# Patient Record
Sex: FEMALE | Race: WHITE | NOT HISPANIC OR LATINO | Employment: OTHER | URBAN - NONMETROPOLITAN AREA
[De-identification: names, ages, dates, MRNs, and addresses within clinical notes are randomized per-mention and may not be internally consistent; named-entity substitution may affect disease eponyms.]

---

## 2018-03-07 ENCOUNTER — HISTORICAL (OUTPATIENT)
Dept: ADMINISTRATIVE | Facility: HOSPITAL | Age: 78
End: 2018-03-07

## 2018-04-06 ENCOUNTER — HISTORICAL (OUTPATIENT)
Dept: ADMINISTRATIVE | Facility: HOSPITAL | Age: 78
End: 2018-04-06

## 2018-07-11 ENCOUNTER — HISTORICAL (OUTPATIENT)
Dept: ADMINISTRATIVE | Facility: HOSPITAL | Age: 78
End: 2018-07-11

## 2019-03-11 ENCOUNTER — HISTORICAL (OUTPATIENT)
Dept: ADMINISTRATIVE | Facility: HOSPITAL | Age: 79
End: 2019-03-11

## 2019-07-02 ENCOUNTER — HISTORICAL (OUTPATIENT)
Dept: ADMINISTRATIVE | Facility: HOSPITAL | Age: 79
End: 2019-07-02

## 2020-01-23 ENCOUNTER — HISTORICAL (OUTPATIENT)
Dept: ADMINISTRATIVE | Facility: HOSPITAL | Age: 80
End: 2020-01-23

## 2020-04-23 ENCOUNTER — HISTORICAL (OUTPATIENT)
Dept: ADMINISTRATIVE | Facility: HOSPITAL | Age: 80
End: 2020-04-23

## 2020-09-14 ENCOUNTER — HISTORICAL (OUTPATIENT)
Dept: ADMINISTRATIVE | Facility: HOSPITAL | Age: 80
End: 2020-09-14

## 2020-09-15 ENCOUNTER — HISTORICAL (OUTPATIENT)
Dept: ADMINISTRATIVE | Facility: HOSPITAL | Age: 80
End: 2020-09-15

## 2020-11-24 ENCOUNTER — HISTORICAL (OUTPATIENT)
Dept: ADMINISTRATIVE | Facility: HOSPITAL | Age: 80
End: 2020-11-24

## 2021-01-20 LAB
DEPRECATED CALCIDIOL+CALCIFEROL SERPL-MC: 40.2 NG/ML (ref 30–100)
EST. AVERAGE GLUCOSE BLD GHB EST-MCNC: 102 MG/DL (ref 70–115)
HBA1C MFR BLD: 5.4 % (ref 4–6)
VIT B12 SERPL-MCNC: >1000 PG/ML (ref 211–946)

## 2021-03-05 ENCOUNTER — HISTORICAL (OUTPATIENT)
Dept: ADMINISTRATIVE | Facility: HOSPITAL | Age: 81
End: 2021-03-05

## 2021-08-01 ENCOUNTER — HISTORICAL (OUTPATIENT)
Dept: ADMINISTRATIVE | Facility: HOSPITAL | Age: 81
End: 2021-08-01

## 2021-08-02 ENCOUNTER — HISTORICAL (OUTPATIENT)
Dept: ADMINISTRATIVE | Facility: HOSPITAL | Age: 81
End: 2021-08-02

## 2021-08-17 ENCOUNTER — HISTORICAL (OUTPATIENT)
Dept: ADMINISTRATIVE | Facility: HOSPITAL | Age: 81
End: 2021-08-17

## 2021-10-28 ENCOUNTER — HISTORICAL (OUTPATIENT)
Dept: ADMINISTRATIVE | Facility: HOSPITAL | Age: 81
End: 2021-10-28

## 2021-11-16 ENCOUNTER — HISTORICAL (OUTPATIENT)
Dept: ADMINISTRATIVE | Facility: HOSPITAL | Age: 81
End: 2021-11-16

## 2022-04-11 ENCOUNTER — HISTORICAL (OUTPATIENT)
Dept: ADMINISTRATIVE | Facility: HOSPITAL | Age: 82
End: 2022-04-11

## 2022-04-27 VITALS
HEIGHT: 64 IN | OXYGEN SATURATION: 98 % | SYSTOLIC BLOOD PRESSURE: 142 MMHG | BODY MASS INDEX: 23.07 KG/M2 | WEIGHT: 135.13 LBS | DIASTOLIC BLOOD PRESSURE: 64 MMHG

## 2022-05-06 ENCOUNTER — HISTORICAL (OUTPATIENT)
Dept: ADMINISTRATIVE | Facility: HOSPITAL | Age: 82
End: 2022-05-06

## 2022-05-26 ENCOUNTER — HISTORICAL (OUTPATIENT)
Dept: ADMINISTRATIVE | Facility: HOSPITAL | Age: 82
End: 2022-05-26

## 2022-06-04 ENCOUNTER — HISTORICAL (OUTPATIENT)
Dept: ADMINISTRATIVE | Facility: HOSPITAL | Age: 82
End: 2022-06-04

## 2022-06-10 ENCOUNTER — HISTORICAL (OUTPATIENT)
Dept: ADMINISTRATIVE | Facility: HOSPITAL | Age: 82
End: 2022-06-10

## 2022-10-12 DIAGNOSIS — G20.A1 PARKINSON DISEASE: Primary | ICD-10-CM

## 2023-02-15 RX ORDER — ENTACAPONE 200 MG/1
200 TABLET ORAL 3 TIMES DAILY
COMMUNITY
End: 2023-07-06

## 2023-02-15 RX ORDER — VENLAFAXINE HYDROCHLORIDE 150 MG/1
150 CAPSULE, EXTENDED RELEASE ORAL
COMMUNITY
Start: 2022-02-16 | End: 2023-03-29

## 2023-02-15 RX ORDER — LORAZEPAM 1 MG/1
1 TABLET ORAL EVERY 6 HOURS PRN
COMMUNITY
End: 2023-03-29

## 2023-02-15 RX ORDER — CARBIDOPA AND LEVODOPA 25; 100 MG/1; MG/1
1 TABLET ORAL 3 TIMES DAILY
COMMUNITY
Start: 2022-09-21 | End: 2023-03-29

## 2023-02-15 RX ORDER — OLMESARTAN MEDOXOMIL 20 MG/1
20 TABLET ORAL DAILY
COMMUNITY

## 2023-02-15 RX ORDER — METOPROLOL SUCCINATE 25 MG/1
25 TABLET, EXTENDED RELEASE ORAL DAILY
COMMUNITY

## 2023-03-29 ENCOUNTER — OFFICE VISIT (OUTPATIENT)
Dept: NEUROLOGY | Facility: CLINIC | Age: 83
End: 2023-03-29
Payer: MEDICARE

## 2023-03-29 VITALS
BODY MASS INDEX: 21 KG/M2 | WEIGHT: 123 LBS | SYSTOLIC BLOOD PRESSURE: 144 MMHG | DIASTOLIC BLOOD PRESSURE: 72 MMHG | HEIGHT: 64 IN

## 2023-03-29 DIAGNOSIS — G20.A1 PARKINSON DISEASE: ICD-10-CM

## 2023-03-29 PROCEDURE — 99999 PR PBB SHADOW E&M-EST. PATIENT-LVL III: ICD-10-PCS | Mod: PBBFAC,,, | Performed by: SPECIALIST

## 2023-03-29 PROCEDURE — 99205 OFFICE O/P NEW HI 60 MIN: CPT | Mod: S$PBB,,, | Performed by: SPECIALIST

## 2023-03-29 PROCEDURE — 99213 OFFICE O/P EST LOW 20 MIN: CPT | Mod: PBBFAC | Performed by: SPECIALIST

## 2023-03-29 PROCEDURE — 99999 PR PBB SHADOW E&M-EST. PATIENT-LVL III: CPT | Mod: PBBFAC,,, | Performed by: SPECIALIST

## 2023-03-29 PROCEDURE — 99205 PR OFFICE/OUTPT VISIT, NEW, LEVL V, 60-74 MIN: ICD-10-PCS | Mod: S$PBB,,, | Performed by: SPECIALIST

## 2023-03-29 RX ORDER — CHOLESTYRAMINE 4 G/9G
POWDER, FOR SUSPENSION ORAL
COMMUNITY
Start: 2023-01-31

## 2023-03-29 RX ORDER — MAGNESIUM 30 MG
TABLET ORAL ONCE
COMMUNITY

## 2023-03-29 RX ORDER — CARBIDOPA AND LEVODOPA 50; 200 MG/1; MG/1
1 TABLET, EXTENDED RELEASE ORAL NIGHTLY
COMMUNITY
Start: 2022-11-22 | End: 2023-07-06

## 2023-03-29 RX ORDER — LANOLIN ALCOHOL/MO/W.PET/CERES
400 CREAM (GRAM) TOPICAL DAILY
COMMUNITY

## 2023-03-29 RX ORDER — LORAZEPAM 1 MG/1
.5-1 TABLET ORAL 2 TIMES DAILY
COMMUNITY

## 2023-03-29 RX ORDER — CARBIDOPA AND LEVODOPA 25; 100 MG/1; MG/1
TABLET ORAL 3 TIMES DAILY
COMMUNITY
Start: 2022-02-16 | End: 2023-07-28 | Stop reason: SDUPTHER

## 2023-03-29 RX ORDER — AMLODIPINE BESYLATE 2.5 MG/1
2.5 TABLET ORAL
COMMUNITY
Start: 2023-02-15

## 2023-03-29 RX ORDER — VIT C/B6/B5/MAGNESIUM/HERB 173 50-5-6-5MG
1 CAPSULE ORAL 2 TIMES DAILY
COMMUNITY
Start: 2023-02-15

## 2023-03-29 RX ORDER — FERROUS SULFATE 325(65) MG
325 TABLET, DELAYED RELEASE (ENTERIC COATED) ORAL
COMMUNITY

## 2023-03-29 RX ORDER — ASPIRIN 81 MG/1
81 TABLET ORAL DAILY
COMMUNITY

## 2023-03-29 RX ORDER — DIVALPROEX SODIUM 250 MG/1
250 TABLET, EXTENDED RELEASE ORAL NIGHTLY
COMMUNITY
Start: 2023-02-15

## 2023-03-29 RX ORDER — PNV NO.95/FERROUS FUM/FOLIC AC 28MG-0.8MG
1000 TABLET ORAL DAILY
COMMUNITY

## 2023-03-29 RX ORDER — VENLAFAXINE HYDROCHLORIDE 75 MG/1
CAPSULE, EXTENDED RELEASE ORAL
COMMUNITY
Start: 2023-02-09

## 2023-03-29 NOTE — PROGRESS NOTES
"Subjective:       Patient ID: Jennifer Rob is a 82 y.o. female.    Chief Complaint: NP ref by Dr Esteves for neuro cons to eval for PD.     HPI:             (Pts daughter (Tarsha) is here today. Pt states she has a freezing-shuffled gait, off balance at times w falls. Diff w swallowing. Denies diff w speech, drooling or hallucinations Sleeping well wo vivid dreams. Pt does not drive, daughter lives w the pt and pt needs asst with bathing,. Pt started PT at home this wk for 2x/wk. Pt gets staring spells and has a hard time breathing. )    They'd seen Sulaiman last year in [facility]     Tool Lithium from age 50 to 69 or so     Depakote 250 and 125 hs and level 'nearly therapeutic now'     notes may also be on facesheet for HPI, ROS, and other sections     Review of Systems  "Gets nervous when she goes to eat"   Oatmeal ok but trouble with steak etc   Saw speech therapy one year ago     Pt had prev expressed to her family that she would never want a PEG tube           Social History     Socioeconomic History    Marital status:    Tobacco Use    Smoking status: Never    Smokeless tobacco: Never   Substance and Sexual Activity    Alcohol use: Not Currently    Drug use: Never     _._does not drive     ----------------------------  Bipolar disorder, unspecified  Hypertension    Current Outpatient Medications   Medication Instructions    amLODIPine (NORVASC) 2.5 mg, Oral    aspirin (ECOTRIN) 81 mg, Oral, Daily    carbidopa-levodopa  mg (SINEMET)  mg per tablet Oral, 3 times daily    carbidopa-levodopa  mg (SINEMET CR)  mg TbSR 1 tablet, Oral, Nightly    cholestyramine (QUESTRAN) 4 gram packet Oral    CYANOCOBALAMIN, VITAMIN B-12, INJ Injection    DEPAKOTE  mg, Oral, Nightly    entacapone (COMTAN) 200 mg, Oral, 3 times daily    ferrous sulfate 325 mg, Oral, 3 times a week    LORazepam (ATIVAN) 0.5-1 mg, Oral, 2 times daily    magnesium 30 mg Tab Oral, Once    magnesium oxide (MAG-OX) 400 " "mg, Oral, Daily    metoprolol succinate (TOPROL-XL) 25 mg, Oral, Daily    olmesartan (BENICAR) 20 mg, Oral, Daily    venlafaxine (EFFEXOR-XR) 75 MG 24 hr capsule Oral    VITAMIN D3 25 mcg (1,000 unit) Chew 1 tablet, Oral, 2 times daily    vitamin E 1,000 Units, Oral, Daily        Objective:        Exam:   BP (!) 144/72   Ht 5' 4" (1.626 m)   Wt 55.8 kg (123 lb)   BMI 21.11 kg/m²     General Exam    if accompanied, by__ dtr   body habitus_ Body mass index is 21.11 kg/m².    mental status_alert and appropriate  oropharynx_Mallampati grade_  neck_  heart__  extremities_  skin_    Neurological:  cortical function__  MMSE; if done:   No flowsheet data found.  Speech __  monotoned   cranial nerves:  CN 2 VF_ok   fundi_   CN 3, 4, 6 EOMs_ok  CN 3, pupils_ok    CN 7_no lower face asymmetry  CN 8_hearing _ ok  CN 12 tongue_ok    Motor__ gen weakness   tone: not very rigid   muscle stretch reflexes__  Vib sens_  Pin sens_  Plantars__ not examined   tremor: _  coordination: _  gait_   Romberg:     Neuroimaging:  Study / studies:   __Images and imaging reports reviewed.      Rads summary:          My comments:  CT head from June 2022 unremarkable for age     Labs:      _._ new patient   ___ multiple issues/ diagnoses or problems  [if not enumerated in note then discussed in encounter but not documented]    complexity of data     _._high _mod   _._ images and reports reviewed:  _._ hx obtained from family or accompaniment:   __other studies reviewed   __studies ordered __   __studies considered or discussed but not ordered __  __DDx discussed __    Risks    _._high _mod   _._ (possible or definite) neurodegenerative condition and inherent progression  __ (poss or def) autoimmune condition with possibility of flares or unexpected attack  __ (poss or def) seiz d.o. with possib of recurr seiz's   __ cerebrovasc ds with risk of recurrence of stroke  _._ CNS meds (and/or) potentially high risk non CNS meds which may cause medical " or behavioral side effects  _._ fall risk  _._ driving discussed   __ diagnosis unclear or DDx wide making risk uncertain to high  __other:    MDM/Medical Decision Making     _._high  _moderate         Assessment/Plan:       Problem List Items Addressed This Visit    None  Visit Diagnoses       Parkinson disease                  Other comments/ follow up:      I ask that the entacapone be only in am until pills run out then stop/don't refill         Aim follow up _6 wk video     Edwin Reardon MD NAKUL

## 2023-05-10 ENCOUNTER — OFFICE VISIT (OUTPATIENT)
Dept: NEUROLOGY | Facility: CLINIC | Age: 83
End: 2023-05-10
Payer: MEDICARE

## 2023-05-10 DIAGNOSIS — G20.A1 PARKINSON'S DISEASE: Primary | ICD-10-CM

## 2023-05-10 PROCEDURE — 99213 OFFICE O/P EST LOW 20 MIN: CPT | Mod: 95,,, | Performed by: SPECIALIST

## 2023-05-10 PROCEDURE — 99213 PR OFFICE/OUTPT VISIT, EST, LEVL III, 20-29 MIN: ICD-10-PCS | Mod: 95,,, | Performed by: SPECIALIST

## 2023-05-10 NOTE — PROGRESS NOTES
Subjective:         Patient ID: Jennifer Rob is a 82 y.o. female.    Chief Complaint: PD follow up     HPI:           No chief complaint on file.  Has weaned off entacapone without ill effects    No recent  falls   No worsening of any tremor           Social History     Socioeconomic History    Marital status:    Tobacco Use    Smoking status: Never    Smokeless tobacco: Never   Substance and Sexual Activity    Alcohol use: Not Currently    Drug use: Never     Working as:   Last worked as:     Current Outpatient Medications   Medication Instructions    amLODIPine (NORVASC) 2.5 mg, Oral    aspirin (ECOTRIN) 81 mg, Oral, Daily    carbidopa-levodopa  mg (SINEMET)  mg per tablet Oral, 3 times daily    carbidopa-levodopa  mg (SINEMET CR)  mg TbSR 1 tablet, Oral, Nightly    cholestyramine (QUESTRAN) 4 gram packet Oral    CYANOCOBALAMIN, VITAMIN B-12, INJ Injection    DEPAKOTE  mg, Oral, Nightly    entacapone (COMTAN) 200 mg, Oral, 3 times daily    ferrous sulfate 325 mg, Oral, 3 times a week    LORazepam (ATIVAN) 0.5-1 mg, Oral, 2 times daily    magnesium 30 mg Tab Oral, Once    magnesium oxide (MAG-OX) 400 mg, Oral, Daily    metoprolol succinate (TOPROL-XL) 25 mg, Oral, Daily    olmesartan (BENICAR) 20 mg, Oral, Daily    venlafaxine (EFFEXOR-XR) 75 MG 24 hr capsule Oral    VITAMIN D3 25 mcg (1,000 unit) Chew 1 tablet, Oral, 2 times daily    vitamin E 1,000 Units, Oral, Daily         Objective:      Exam  There were no vitals taken for this visit.  This a telemed visit   General:   If accompanied, by:_ dtr     Neurological  Speech: ok    hesitant perhaps   Gait: unassisted  exaggerated arm swing    deliberate fashion     Tremor: none  seen   Bradykinesia: mod  Dyskinesias: none  seen     Visit type: audiovisual    video time with patient: 10minutes     15 minutes of total time spent on the encounter, which includes face to face time and non-face to face time preparing to see the  patient (eg, review of tests), Obtaining and/or reviewing separately obtained history, Documenting clinical information in the electronic or other health record, Independently interpreting results (not separately reported) and communicating results to the patient/family/caregiver, or Care coordination (not separately reported).       Each patient to whom he or she provides medical services by telemedicine is:  (1) informed of the relationship between the physician and patient and the respective role of any other health care provider with respect to management of the patient; and (2) notified that he or she may decline to receive medical services by telemedicine and may withdraw from such care at any time.    The patient location is: home; or _____              Assessment/Plan:       Problem List Items Addressed This Visit          Neuro    Parkinson's disease - Primary       Other comments/ follow up:        No med ch's   I contemplated and we discussed further decr of parkinson med but dtr said she'd not fallen since on the park's meds so I kept them unchanged until next visit at least      At that time may discuss decrease        Aim follow up _6 w f to f     MD ARNOLDO WrayA

## 2023-07-06 ENCOUNTER — OFFICE VISIT (OUTPATIENT)
Dept: NEUROLOGY | Facility: CLINIC | Age: 83
End: 2023-07-06
Payer: MEDICARE

## 2023-07-06 VITALS
BODY MASS INDEX: 20.83 KG/M2 | HEIGHT: 64 IN | WEIGHT: 122 LBS | DIASTOLIC BLOOD PRESSURE: 62 MMHG | SYSTOLIC BLOOD PRESSURE: 120 MMHG

## 2023-07-06 DIAGNOSIS — G20.A1 PARKINSON'S DISEASE: Primary | ICD-10-CM

## 2023-07-06 PROCEDURE — 99215 OFFICE O/P EST HI 40 MIN: CPT | Mod: S$PBB,,, | Performed by: SPECIALIST

## 2023-07-06 PROCEDURE — 99215 PR OFFICE/OUTPT VISIT, EST, LEVL V, 40-54 MIN: ICD-10-PCS | Mod: S$PBB,,, | Performed by: SPECIALIST

## 2023-07-06 PROCEDURE — 99999 PR PBB SHADOW E&M-EST. PATIENT-LVL III: ICD-10-PCS | Mod: PBBFAC,,, | Performed by: SPECIALIST

## 2023-07-06 PROCEDURE — 99213 OFFICE O/P EST LOW 20 MIN: CPT | Mod: PBBFAC | Performed by: SPECIALIST

## 2023-07-06 PROCEDURE — 99999 PR PBB SHADOW E&M-EST. PATIENT-LVL III: CPT | Mod: PBBFAC,,, | Performed by: SPECIALIST

## 2023-07-06 NOTE — PROGRESS NOTES
"Subjective:         Patient ID: Jennifer Rob is a 82 y.o. female.    Chief Complaint: PD follow up     HPI:           Follow-up and Parkinson's disease follow up (Reports having noticed some decline in the last few months/../At times, will have tremor to hands and head; having more diff w freezing, shuffling of gait; denies falls, but has been more unsteady/../Poor appetite; does report that food is more diff to swallow, denies choking/../Inc sleepiness during the day; awakens intermittently at night d/t nocturia/../Dtr has written notes: notation of irritability, anxiety)    notes may also be on facesheet for HPI, ROS, and other sections     Neurological ROS:   Falls:  Hallucinations:   RBD:   Sleep:        Social History     Socioeconomic History    Marital status:    Tobacco Use    Smoking status: Never    Smokeless tobacco: Never   Substance and Sexual Activity    Alcohol use: Not Currently    Drug use: Never     Working as:   Last worked as:     Current Outpatient Medications   Medication Instructions    amLODIPine (NORVASC) 2.5 mg, Oral    aspirin (ECOTRIN) 81 mg, Oral, Daily    carbidopa-levodopa  mg (SINEMET)  mg per tablet Oral, 3 times daily    carbidopa-levodopa  mg (SINEMET CR)  mg TbSR 1 tablet, Oral, Nightly    cholestyramine (QUESTRAN) 4 gram packet Oral    CYANOCOBALAMIN, VITAMIN B-12, INJ Injection    DEPAKOTE  mg, Oral, Nightly    ferrous sulfate 325 mg, Oral, 3 times a week    LORazepam (ATIVAN) 0.5-1 mg, Oral, 2 times daily    magnesium 30 mg Tab Oral, Once    magnesium oxide (MAG-OX) 400 mg, Oral, Daily    metoprolol succinate (TOPROL-XL) 25 mg, Oral, Daily    olmesartan (BENICAR) 20 mg, Oral, Daily    venlafaxine (EFFEXOR-XR) 75 MG 24 hr capsule Oral    VITAMIN D3 25 mcg (1,000 unit) Chew 1 tablet, Oral, 2 times daily    vitamin E 1,000 Units, Oral, Daily         Objective:      Exam  /62   Ht 5' 4" (1.626 m)   Wt 55.3 kg (122 lb)   BMI 20.94 " kg/m²     General:   [] Unaccompanied   [x] Accompanied, by__ two dtr's   heart:   pharynx:    Neurological []nl   []Abnml:     Speech:  [x]  []  vis fields: []  []  EOMs:  [x]  []  funduscopic: []  []  Motor:   [x]  []  coord:   []  []  Gait:   []  [x] Unassisted  but cautious     Tremor:       [x]None  []Rest       []Action       []Postural     Bradykinesia: []None   []Mild       []moderate      [x]Severe     Dyskinesias: [x]None   []Mild   []Moderate  []Severe       Neuroimaging:  []Images and imaging reports reviewed.  My comments:     Labs:    meds:      [x]  Multiple Issues/ diagnoses or problems  [if not enumerated in note then discussed but not documented]    Complexity of Data:      [x] High   [] Moderate   [] Images and reports reviewed:  [x] History obtained from family or accompaniment:   [] Other studies reviewed   [] Studies considered or discussed but not ordered __  [] Studies ordered __   [] Differential Diagnoses discussed __    Risks:   [] High   [] Moderate   [x] (poss or definite) neurodegenerative condition and inherent progression  [x] CNS meds (and/or) potentially high risk non CNS meds taken or discussed which may cause med or behav SE's  [x] Fall risk  [] Driving discussed   []:    MDM:      [x] High      [] Moderate       Parkinson's medications can be associated with certain side effects.  Nausea and abdominal symptoms typically improve in time.  Impulse control disorders including persistent thoughts or behaviors involving shopping gambling or sex can be problematic.  Excessive daytime sleepiness including car crashes have been reported.   Delusions hallucinations and paranoia can also occur, typically with higher doses in older patients.   Abrupt stoppage of high dose parkinson's medications can be medically troublesome.          Assessment/Plan:         ICD-10-CM ICD-9-CM   1. Parkinson's disease  G20 332.0         Other comments/ follow up:        Medications Discontinued During This  Encounter   Medication Reason    carbidopa-levodopa  mg (SINEMET CR)  mg TbSR       No orders of the defined types were placed in this encounter.    Follow up in about 2 months (around 9/6/2023).    MD ARNOLDO WrayA

## 2023-07-26 ENCOUNTER — LAB VISIT (OUTPATIENT)
Dept: LAB | Facility: HOSPITAL | Age: 83
End: 2023-07-26
Attending: NURSE PRACTITIONER
Payer: MEDICARE

## 2023-07-26 DIAGNOSIS — N18.31 CHRONIC KIDNEY DISEASE (CKD) STAGE G3A/A1, MODERATELY DECREASED GLOMERULAR FILTRATION RATE (GFR) BETWEEN 45-59 ML/MIN/1.73 SQUARE METER AND ALBUMINURIA CREATININE RATIO LESS THAN 30 MG/G: ICD-10-CM

## 2023-07-26 DIAGNOSIS — N11.8: ICD-10-CM

## 2023-07-26 DIAGNOSIS — F31.9 BIPOLAR AFFECTIVE DISORDER, REMISSION STATUS UNSPECIFIED: ICD-10-CM

## 2023-07-26 DIAGNOSIS — I10 ESSENTIAL HYPERTENSION, MALIGNANT: ICD-10-CM

## 2023-07-26 DIAGNOSIS — N17.8 ACUTE RENAL FAILURE WITH PATHOLOGICAL LESION IN KIDNEY: Primary | ICD-10-CM

## 2023-07-26 LAB
ALBUMIN SERPL-MCNC: 4.2 G/DL (ref 3.4–5)
BASOPHILS # BLD AUTO: 0.05 X10(3)/MCL (ref 0.01–0.08)
BASOPHILS NFR BLD AUTO: 0.5 % (ref 0.1–1.2)
BUN SERPL-MCNC: 26 MG/DL (ref 7–20)
CALCIUM SERPL-MCNC: 9.4 MG/DL (ref 8.4–10.2)
CALCIUM SERPL-MCNC: 9.4 MG/DL (ref 8.4–10.2)
CHLORIDE SERPL-SCNC: 103 MMOL/L (ref 98–110)
CO2 SERPL-SCNC: 28 MMOL/L (ref 21–32)
CREAT SERPL-MCNC: 1.18 MG/DL (ref 0.66–1.25)
CREAT/UREA NIT SERPL: 22 (ref 12–20)
EOSINOPHIL # BLD AUTO: 0.11 X10(3)/MCL (ref 0.04–0.36)
EOSINOPHIL NFR BLD AUTO: 1 % (ref 0.7–7)
ERYTHROCYTE [DISTWIDTH] IN BLOOD BY AUTOMATED COUNT: 12.5 % (ref 11–14.5)
GFR SERPLBLD CREATININE-BSD FMLA CKD-EPI: 46 MLS/MIN/1.73/M2
GLUCOSE SERPL-MCNC: 152 MG/DL (ref 70–115)
HCT VFR BLD AUTO: 39.9 % (ref 36–48)
HGB BLD-MCNC: 13 G/DL (ref 11.8–16)
IMM GRANULOCYTES # BLD AUTO: 0.05 X10(3)/MCL (ref 0–0.03)
IMM GRANULOCYTES NFR BLD AUTO: 0.5 % (ref 0–0.5)
LYMPHOCYTES # BLD AUTO: 2.43 X10(3)/MCL (ref 1.16–3.74)
LYMPHOCYTES NFR BLD AUTO: 22.6 % (ref 20–55)
MCH RBC QN AUTO: 32.9 PG (ref 27–34)
MCHC RBC AUTO-ENTMCNC: 32.6 G/DL (ref 31–37)
MCV RBC AUTO: 101 FL (ref 79–99)
MONOCYTES # BLD AUTO: 0.83 X10(3)/MCL (ref 0.24–0.36)
MONOCYTES NFR BLD AUTO: 7.7 % (ref 4.7–12.5)
NEUTROPHILS # BLD AUTO: 7.29 X10(3)/MCL (ref 1.56–6.13)
NEUTROPHILS NFR BLD AUTO: 67.7 % (ref 37–73)
NRBC BLD AUTO-RTO: 0 %
PHOSPHATE SERPL-MCNC: 3.8 MG/DL (ref 2.5–4.9)
PLATELET # BLD AUTO: 225 X10(3)/MCL (ref 140–371)
PMV BLD AUTO: 10.7 FL (ref 9.4–12.4)
POTASSIUM SERPL-SCNC: 4.7 MMOL/L (ref 3.5–5.1)
PTH-INTACT SERPL-MCNC: 58.4 PG/ML (ref 14–73)
RBC # BLD AUTO: 3.95 X10(6)/MCL (ref 4–5.1)
SODIUM SERPL-SCNC: 143 MMOL/L (ref 135–145)
WBC # SPEC AUTO: 10.76 X10(3)/MCL (ref 4–11.5)

## 2023-07-26 PROCEDURE — 80069 RENAL FUNCTION PANEL: CPT

## 2023-07-26 PROCEDURE — 83970 ASSAY OF PARATHORMONE: CPT

## 2023-07-26 PROCEDURE — 85025 COMPLETE CBC W/AUTO DIFF WBC: CPT

## 2023-07-26 PROCEDURE — 36415 COLL VENOUS BLD VENIPUNCTURE: CPT

## 2023-07-28 ENCOUNTER — TELEPHONE (OUTPATIENT)
Dept: NEUROLOGY | Facility: CLINIC | Age: 83
End: 2023-07-28
Payer: MEDICARE

## 2023-07-28 RX ORDER — CARBIDOPA AND LEVODOPA 25; 100 MG/1; MG/1
TABLET ORAL
Qty: 540 TABLET | Refills: 3 | Status: SHIPPED | OUTPATIENT
Start: 2023-07-28

## 2023-07-28 NOTE — TELEPHONE ENCOUNTER
Medication: Carbidopa/Levodopa 25/100mg     Pharmacy:   [facility] [address] [place] [address]  Phone: [phone] Fax: [phone]       Last Appointment: 7/6/2023     Next Appointment: 9/13/2023     Call back number: [phone]       Patient's daughter Tarsha CUNNINGHAM stating during last visit Dr. Reardon increased patient's medication to be taken   2 tab in morning, 1 tab at noon, 1 tab at bedtime. She states Rx was given by PCP in the past and they'll be running out of medication after increase.

## 2023-09-11 ENCOUNTER — TELEPHONE (OUTPATIENT)
Dept: NEUROLOGY | Facility: CLINIC | Age: 83
End: 2023-09-11
Payer: MEDICARE

## 2023-09-11 NOTE — TELEPHONE ENCOUNTER
S/w Ivis. Prefers to move to 1pm that day for telemed. Will discuss with management to unblock in order to schedule.

## 2023-09-11 NOTE — TELEPHONE ENCOUNTER
"Ivis (daughter) is asking if Dr. Reardon is doing telemed at this time? If so, asking if upcoming appt can be telemed and if not, can her appt be scheduled later as mornings are "rough" for patient.     Phone: [phone]  "

## 2023-09-13 ENCOUNTER — OFFICE VISIT (OUTPATIENT)
Dept: NEUROLOGY | Facility: CLINIC | Age: 83
End: 2023-09-13
Payer: MEDICARE

## 2023-09-13 DIAGNOSIS — F31.9 BIPOLAR AFFECTIVE DISORDER, REMISSION STATUS UNSPECIFIED: ICD-10-CM

## 2023-09-13 DIAGNOSIS — G20.A1 PARKINSON'S DISEASE: Primary | ICD-10-CM

## 2023-09-13 PROCEDURE — 99213 PR OFFICE/OUTPT VISIT, EST, LEVL III, 20-29 MIN: ICD-10-PCS | Mod: 95,,, | Performed by: SPECIALIST

## 2023-09-13 PROCEDURE — 99213 OFFICE O/P EST LOW 20 MIN: CPT | Mod: 95,,, | Performed by: SPECIALIST

## 2023-09-13 NOTE — PROGRESS NOTES
This is a telemedicine note.   Patient was treated using telemedicine, real time audio and video, according to [facility] protocols.   I, Edwin Reardon MD, conducted the visit from the Neurology clinic of [facility].   The patient participated in the visit at a non-[facility] location selected by the patient, identified below.   I am licensed in the state where the patient stated they are located.   The patient stated that they understood and accepted the privacy and security risks to their information at their location.   This visit is not recorded.    Patient was located at the patient's home.     Jennifer Rob is a 83 y.o. female seen today via telemedicine visit.       Subjective:         Patient ID: Jennifer Rob is a 83 y.o. female.    Chief Complaint: pd fu     HPI:           No chief complaint on file.      notes may also be on facesheet for HPI, ROS, and other sections     Review of Systems  Feet shuffle   am's slow to get moving   Bipolar hx     Sees Yosvany         Social History     Socioeconomic History    Marital status:    Tobacco Use    Smoking status: Never    Smokeless tobacco: Never   Substance and Sexual Activity    Alcohol use: Not Currently    Drug use: Never     Either dtr or niece w her at all times     Current Outpatient Medications   Medication Instructions    amLODIPine (NORVASC) 2.5 mg, Oral    aspirin (ECOTRIN) 81 mg, Oral, Daily    carbidopa-levodopa  mg (SINEMET)  mg per tablet 1-2 tid as directed    cholestyramine (QUESTRAN) 4 gram packet Oral    CYANOCOBALAMIN, VITAMIN B-12, INJ Injection    DEPAKOTE  mg, Oral, Nightly    ferrous sulfate 325 mg, Oral, 3 times a week    LORazepam (ATIVAN) 0.5-1 mg, Oral, 2 times daily    magnesium 30 mg Tab Oral, Once    magnesium oxide (MAG-OX) 400 mg, Oral, Daily    metoprolol succinate (TOPROL-XL) 25 mg, Oral, Daily    olmesartan (BENICAR) 20 mg, Oral, Daily    venlafaxine (EFFEXOR-XR) 75 MG 24 hr capsule Oral     VITAMIN D3 25 mcg (1,000 unit) Chew 1 tablet, Oral, 2 times daily    vitamin E 1,000 Units, Oral, Daily        Objective:      Exam Limited due to telemedicine restrictions.  There were no vitals taken for this visit.    General:   if accompanied, by:_ dtr Tarsha on her shoulder     Neurological  Speech: ok   EOMs:  coord:   Gait:   No severe tremor or dysk seen   Neuroimaging:  Images and imaging reports reviewed.  My comments:     Labs:    meds:          Assessment/Plan:       Problem List Items Addressed This Visit          Neuro    Parkinson's disease - Primary       Psychiatric    Bipolar disorder, unspecified       Other comments/ follow up:      Discussed titration of levod or venlaf   held steady   Cont pres mgmt     Follow up in about 4 months (around 1/13/2024) for Virtual Visit.    Video Time Documentation:  Spent 11 minutes with patient over video discussing health concerns.     Total time this visit:     15 min    Edwin Reardon MD NAKUL FAAN FAASM

## 2024-01-31 ENCOUNTER — OFFICE VISIT (OUTPATIENT)
Dept: NEUROLOGY | Facility: CLINIC | Age: 84
End: 2024-01-31
Payer: MEDICARE

## 2024-01-31 DIAGNOSIS — G20.A1 PARKINSON'S DISEASE WITHOUT DYSKINESIA OR FLUCTUATING MANIFESTATIONS: ICD-10-CM

## 2024-01-31 PROCEDURE — 99213 OFFICE O/P EST LOW 20 MIN: CPT | Mod: 95,,, | Performed by: SPECIALIST

## 2024-01-31 NOTE — PROGRESS NOTES
This is a telemedicine note.   Patient was treated using telemedicine, real time audio and video, according to [facility] protocols. This visit is not recorded.  I, Edwin Reardon MD, conducted the visit from the Neurology clinic of [facility]. The patient participated in the visit at a non-[facility] location selected by the patient, [facility].   I am licensed in [place] where the patient stated they are located. The patient stated that they understood and accepted the privacy and security risks to their information at their location.     Jennifer Rob is a 83 y.o. female seen today via telemedicine visit.   Subjective:    Patient ID: Jennifer Rob is a 83 y.o. female.  Chief Complaint: pd fu   HPI:           Overall stable dtr's feel     AM's harder but better after noon     Dtr describes some eyelid opening apraxia perhaps and pt co exc daytime sleepiness      Current Outpatient Medications   Medication Instructions    amLODIPine (NORVASC) 2.5 mg, Oral    aspirin (ECOTRIN) 81 mg, Oral, Daily    carbidopa-levodopa  mg (SINEMET)  mg per tablet 1-2 tid as directed    cholestyramine (QUESTRAN) 4 gram packet Oral    CYANOCOBALAMIN, VITAMIN B-12, INJ Injection    DEPAKOTE  mg, Oral, Nightly    ferrous sulfate 325 mg, Oral, 3 times a week    LORazepam (ATIVAN) 0.5-1 mg, Oral, 2 times daily    magnesium 30 mg Tab Oral, Once    magnesium oxide (MAG-OX) 400 mg, Oral, Daily    metoprolol succinate (TOPROL-XL) 25 mg, Oral, Daily    olmesartan (BENICAR) 20 mg, Oral, Daily    venlafaxine (EFFEXOR-XR) 75 MG 24 hr capsule Oral    VITAMIN D3 25 mcg (1,000 unit) Chew 1 tablet, Oral, 2 times daily    vitamin E 1,000 Units, Oral, Daily      CD LD 25/100 2 in am  one at 1pm and one at 5pm     Sometimes a quarter tab of ativan really helps her     Objective:      Exam Limited due to telemedicine restrictions.  There were no vitals taken for this visit.  if accompanied, by:_ dtr   Speech: ok   EOMs: ok   No  overt tremor or dyskinesia and seems lucid / well dressed and groomed   Dtr at her side and in process of cooking her supper     Assessment/Plan:     Problem List Items Addressed This Visit          Neuro    Parkinson's disease       Other comments/ follow up:    Cont pres mgmt   Aim revisit virtual 4 mos     Video Time Documentation:  Spent 10 minutes with patient over video discussing health concerns.   Total time this visit:     12 min    Edwin Reardon MD NAKUL FAAN FAASM

## 2024-06-17 ENCOUNTER — OFFICE VISIT (OUTPATIENT)
Dept: NEUROLOGY | Facility: CLINIC | Age: 84
End: 2024-06-17
Payer: MEDICARE

## 2024-06-17 DIAGNOSIS — G47.19 EXCESSIVE DAYTIME SLEEPINESS: ICD-10-CM

## 2024-06-17 DIAGNOSIS — G20.A1 PARKINSON'S DISEASE, UNSPECIFIED WHETHER DYSKINESIA PRESENT, UNSPECIFIED WHETHER MANIFESTATIONS FLUCTUATE: Primary | ICD-10-CM

## 2024-06-17 PROCEDURE — 99214 OFFICE O/P EST MOD 30 MIN: CPT | Mod: 95,,, | Performed by: SPECIALIST

## 2024-06-17 RX ORDER — CARBIDOPA AND LEVODOPA 10; 100 MG/1; MG/1
TABLET ORAL
Qty: 150 TABLET | Refills: 0 | Status: SHIPPED | OUTPATIENT
Start: 2024-06-17

## 2024-06-17 NOTE — PROGRESS NOTES
This is a telemedicine note. See bottom of note for boilerplate elements.     Jennifer Rob is a 83 y.o. female seen today via telemedicine visit.   Subjective:    Patient ID: Jennifer Rob is a 83 y.o. female.  Chief Complaint: virtual follow up for dx or symptoms: PD virtual fu     HPI:             Current Outpatient Medications   Medication Instructions    amLODIPine (NORVASC) 2.5 mg, Oral    aspirin (ECOTRIN) 81 mg, Oral, Daily    carbidopa-levodopa  mg (SINEMET)  mg per tablet 1-2 tid as directed    cholestyramine (QUESTRAN) 4 gram packet Oral    CYANOCOBALAMIN, VITAMIN B-12, INJ Injection    DEPAKOTE  mg, Oral, Nightly    ferrous sulfate 325 mg, Oral, 3 times a week    LORazepam (ATIVAN) 0.5-1 mg, Oral, 2 times daily    magnesium 30 mg Tab Oral, Once    magnesium oxide (MAG-OX) 400 mg, Oral, Daily    metoprolol succinate (TOPROL-XL) 25 mg, Oral, Daily    olmesartan (BENICAR) 20 mg, Oral, Daily    venlafaxine (EFFEXOR-XR) 75 MG 24 hr capsule Oral    VITAMIN D3 25 mcg (1,000 unit) Chew 1 tablet, Oral, 2 times daily    vitamin E 1,000 Units, Oral, Daily        Objective:      Exam Limited due to telemedicine restrictions.  There were no vitals taken for this visit.  if accompanied, by:_ dtr  Speech: ok   EOM's ok   Hearing impaired so dtr had to translate in her ear     Neuroimaging:  []Images and imaging reports reviewed.  My comments:     Labs:    meds:      _;__ multiple issues/ diagnoses or problems [if not enumerated in note then discussed in encounter but not documented]    complexity of data     __high ;_mod   __ images and reports reviewed _;_ hx obtained from family or accompaniment __studies ordered __   __studies considered or discussed but not ordered __ __DDx discussed    Risks    _;_high _mod   __ (possible or definite) neurodegenerative condition and inherent progression  __ (poss or def) autoimmune condition with possibility of flares or unexpected attack  __ (poss or  def) seiz d.o. with possib of recurr seiz's   __ cerebrovasc ds with risk of recurrence of stroke  __ CNS meds (and/or) potentially high risk non CNS meds which may cause medical or behavioral side effects  _;_ fall risk  __ driving discussed __ diagnosis unclear or DDx wide making risk uncertain to high  __other:    MDM/Medical Decision Making     __high  _;moderate   Assessment/Plan:     Problem List Items Addressed This Visit          Neuro    Parkinson's disease - Primary       Other comments/ follow up:    Imaging orders (if any):   No orders of the defined types were placed in this encounter.   Ch levodopa formulation to 10/100 to see if constip improves      Aim 3 mos virtual fu     Video Time Documentation:  Spent 10 minutes with patient over video discussing health concerns.   Total time this visit:   16   minutes    This is a telemedicine note.   Patient was treated using telemedicine, real time audio and video, according to [facility] protocols. This visit is not recorded.  IEdwin MD, conducted the visit from the Neurology clinic of [facility]. The patient participated in the visit at a non-[facility] location selected by the patient, [facility].   I am licensed in [place] where the patient stated they are located. The patient stated that they understood and accepted the privacy and security risks to their information at their location.

## 2024-07-16 ENCOUNTER — TELEPHONE (OUTPATIENT)
Dept: NEUROLOGY | Facility: CLINIC | Age: 84
End: 2024-07-16
Payer: MEDICARE

## 2024-07-16 NOTE — TELEPHONE ENCOUNTER
----- Message from Alia Fitzgerald sent at 2024  1:21 PM CDT -----  Regarding: med update  To:          Office  From:        Ivis Reardon  Phone:       [phone]  Patient:     Jennifer Rob  :         38  RegDr:      Dr Edwin Reardon  Ref:         about a medication that  he wanted to know if it is working    Subject:          Patient Calls  ClrID:    [phone]    --------------------------------------  Message History  Account: [id number]  Taken:  2024 11:36a CAT

## 2024-08-27 NOTE — PROGRESS NOTES
"Subjective:         Patient ID: Jennifer Rob is a 84 y.o. female.    Chief Complaint/HPI:   Chief Complaint   Patient presents with    F/U PD.      Denies any tremors. Freezing-shuffled gait, off balance wo falls.Ambulates w Rolator. Lt leg shakes when she walks.  Has to hold on to the walls at time not to fall. Diff w swallowing has to cut food in small bites. And drooling. Denies diff w speech or hallucinations Sleeping well wo vivid dreams. Does not drive, daughter lives her and needs asst w bathing. Pts daughter (Tarsha) is here today.         Addn HPI:          The 10/100's were used with loose stools so went back to the 25/100  Dtr asking for PT again     Dtr describes magnetic gait or festination at times      ...  notes may also be on facesheet for HPI, ROS, and other sections   ROS:             Current Outpatient Medications   Medication Instructions    amLODIPine (NORVASC) 2.5 mg, Oral    aspirin (ECOTRIN) 81 mg, Oral, Daily    carbidopa-levodopa  mg (SINEMET)  mg per tablet 2tabs in early AM 2 tabs noon 1tab evening    carbidopa-levodopa  mg (SINEMET)  mg per tablet 1-2 tablets, Oral, 3 times daily, 2 tab 730 2 tab 1 pm and 1 tab at 5     cholestyramine (QUESTRAN) 4 gram packet Take by mouth.    CYANOCOBALAMIN, VITAMIN B-12, INJ Inject as directed.    DEPAKOTE  mg, Oral, Nightly    ferrous sulfate 325 mg, Oral, Weekly    LORazepam (ATIVAN) 0.5-1 mg, Oral, 2 times daily    magnesium 30 mg Tab Once    magnesium oxide (MAG-OX) 400 mg, Daily    metoprolol succinate (TOPROL-XL) 25 mg, Oral, Daily    olmesartan (BENICAR) 20 mg, Oral, Daily    venlafaxine (EFFEXOR-XR) 75 MG 24 hr capsule Oral    VITAMIN D3 25 mcg (1,000 unit) Chew 1 tablet, Oral, 2 times daily    vitamin E 1,000 Units, Daily      Objective:      Exam  BP (!) 152/82   Ht 5' 4" (1.626 m)   Wt 54.4 kg (120 lb)   BMI 20.60 kg/m²   General:   If Accompanied, by__ dtr   heart:   pharynx:  Neurological   Speech: Ok "   vis fields:    EOMs: Seem ok    funduscopic:   Motor:  Ok    coord:     Gait:  Walker   can walk without it with handheld assist    turns more cautious without rollator         Neuroimaging:  []Images and imaging reports reviewed. My comments:     Labs:    [x]  Multiple Issues/ diagnoses or problems  [if not enumerated in note then discussed but not documented]    Complexity of Data:   [x] High    [] Moderate   [] Images and reports reviewed [x] History obtained from accompaniment  [] Studies ordered [] Studies consid or discussed, not ordered   [] Differential Diagnoses discussed     Risks:   [x] High     [] Moderate   [x] (poss or def) neurodegenerative condition [] () autoimmune condition with possibility of flares or unexpected attack  [] () seiz d.o. with possib of recurr seiz's  [] Cerebrovasc ds with risk of recurrent stroke  [] CNS meds (and/or) potentially high risk non CNS meds taken or discussed which may cause med or behav SE's  [x] Fall risk [] Driving discussed  [] Diagnosis unclear or DDx wide making risk uncertain   []:    MDM:    [x] High     [] Moderate         Assessment/Plan:         ICD-10-CM ICD-9-CM   1. Parkinson's disease without dyskinesia, with fluctuating manifestations  G20.A2 332.0   2. Gait disorder  R26.9 781.2     Other comments/ follow up:         Aim virtual visit 4-6 wk   Patient Instructions      Ok to give a 25/100 with a 10/100 sinemet (ok to mix them)     Do not give ativan unless needed     use only if needed not on any schedule   In a week or two can add a levodopa 10/100 or 25/100 at 830pm med time       Edwin Reardon MD NAK[facility] FAAN FA[facility]

## 2024-08-28 ENCOUNTER — OFFICE VISIT (OUTPATIENT)
Dept: NEUROLOGY | Facility: CLINIC | Age: 84
End: 2024-08-28
Payer: MEDICARE

## 2024-08-28 VITALS
HEIGHT: 64 IN | BODY MASS INDEX: 20.49 KG/M2 | SYSTOLIC BLOOD PRESSURE: 152 MMHG | WEIGHT: 120 LBS | DIASTOLIC BLOOD PRESSURE: 82 MMHG

## 2024-08-28 DIAGNOSIS — G20.A2 PARKINSON'S DISEASE WITHOUT DYSKINESIA, WITH FLUCTUATING MANIFESTATIONS: Primary | ICD-10-CM

## 2024-08-28 DIAGNOSIS — R26.9 GAIT DISORDER: ICD-10-CM

## 2024-08-28 PROCEDURE — 99215 OFFICE O/P EST HI 40 MIN: CPT | Mod: S$PBB,,, | Performed by: SPECIALIST

## 2024-08-28 PROCEDURE — 99213 OFFICE O/P EST LOW 20 MIN: CPT | Mod: PBBFAC | Performed by: SPECIALIST

## 2024-08-28 PROCEDURE — 99999 PR PBB SHADOW E&M-EST. PATIENT-LVL III: CPT | Mod: PBBFAC,,, | Performed by: SPECIALIST

## 2024-08-28 RX ORDER — CARBIDOPA AND LEVODOPA 25; 100 MG/1; MG/1
1-2 TABLET ORAL 3 TIMES DAILY
COMMUNITY
Start: 2024-07-15

## 2024-08-28 NOTE — PATIENT INSTRUCTIONS
Ok to give a 25/100 with a 10/100 sinemet (ok to mix them)     Do not give ativan unless needed     use only if needed not on any schedule   In a week or two can add a levodopa 10/100 or 25/100 at 830pm med time

## 2024-10-01 ENCOUNTER — LAB VISIT (OUTPATIENT)
Dept: LAB | Facility: HOSPITAL | Age: 84
End: 2024-10-01
Attending: NURSE PRACTITIONER
Payer: MEDICARE

## 2024-10-01 DIAGNOSIS — N18.31 CHRONIC KIDNEY DISEASE (CKD) STAGE G3A/A1, MODERATELY DECREASED GLOMERULAR FILTRATION RATE (GFR) BETWEEN 45-59 ML/MIN/1.73 SQUARE METER AND ALBUMINURIA CREATININE RATIO LESS THAN 30 MG/G: ICD-10-CM

## 2024-10-01 DIAGNOSIS — I10 HYPERTENSION, UNSPECIFIED TYPE: ICD-10-CM

## 2024-10-01 DIAGNOSIS — N17.8 ACUTE RENAL FAILURE WITH PATHOLOGICAL LESION IN KIDNEY: Primary | ICD-10-CM

## 2024-10-01 DIAGNOSIS — F31.9 BIPOLAR AFFECTIVE DISORDER, REMISSION STATUS UNSPECIFIED: ICD-10-CM

## 2024-10-01 DIAGNOSIS — N11.8: ICD-10-CM

## 2024-10-01 LAB
ALBUMIN SERPL-MCNC: 4.3 G/DL (ref 3.4–5)
BACTERIA #/AREA URNS AUTO: ABNORMAL /HPF
BASOPHILS # BLD AUTO: 0.05 X10(3)/MCL (ref 0.01–0.08)
BASOPHILS NFR BLD AUTO: 0.5 % (ref 0.1–1.2)
BILIRUB UR QL STRIP.AUTO: NEGATIVE
BUN SERPL-MCNC: 29 MG/DL (ref 7–20)
CALCIUM SERPL-MCNC: 9.7 MG/DL (ref 8.4–10.2)
CALCIUM SERPL-MCNC: 9.7 MG/DL (ref 8.4–10.2)
CHLORIDE SERPL-SCNC: 102 MMOL/L (ref 98–110)
CLARITY UR: CLEAR
CO2 SERPL-SCNC: 30 MMOL/L (ref 21–32)
COLOR UR AUTO: YELLOW
CREAT SERPL-MCNC: 1.5 MG/DL (ref 0.66–1.25)
CREAT UR-MCNC: 130.3 MG/DL
CREAT/UREA NIT SERPL: 19 (ref 12–20)
EOSINOPHIL # BLD AUTO: 0.15 X10(3)/MCL (ref 0.04–0.36)
EOSINOPHIL NFR BLD AUTO: 1.5 % (ref 0.7–7)
ERYTHROCYTE [DISTWIDTH] IN BLOOD BY AUTOMATED COUNT: 12.2 % (ref 11–14.5)
GFR SERPLBLD CREATININE-BSD FMLA CKD-EPI: 34 ML/MIN/1.73/M2
GLUCOSE SERPL-MCNC: 101 MG/DL (ref 70–115)
GLUCOSE UR QL STRIP: NEGATIVE
HCT VFR BLD AUTO: 40.5 % (ref 36–48)
HGB BLD-MCNC: 13.1 G/DL (ref 11.8–16)
HGB UR QL STRIP: NEGATIVE
IMM GRANULOCYTES # BLD AUTO: 0.03 X10(3)/MCL (ref 0–0.03)
IMM GRANULOCYTES NFR BLD AUTO: 0.3 % (ref 0–0.5)
KETONES UR QL STRIP: NEGATIVE
LEUKOCYTE ESTERASE UR QL STRIP: ABNORMAL
LYMPHOCYTES # BLD AUTO: 2.53 X10(3)/MCL (ref 1.16–3.74)
LYMPHOCYTES NFR BLD AUTO: 25 % (ref 20–55)
MCH RBC QN AUTO: 32.3 PG (ref 27–34)
MCHC RBC AUTO-ENTMCNC: 32.3 G/DL (ref 31–37)
MCV RBC AUTO: 100 FL (ref 79–99)
MONOCYTES # BLD AUTO: 1 X10(3)/MCL (ref 0.24–0.36)
MONOCYTES NFR BLD AUTO: 9.9 % (ref 4.7–12.5)
NEUTROPHILS # BLD AUTO: 6.37 X10(3)/MCL (ref 1.56–6.13)
NEUTROPHILS NFR BLD AUTO: 62.8 % (ref 37–73)
NITRITE UR QL STRIP: NEGATIVE
NRBC BLD AUTO-RTO: 0 %
PH UR STRIP: 6 [PH]
PHOSPHATE SERPL-MCNC: 4.2 MG/DL (ref 2.5–4.9)
PLATELET # BLD AUTO: 228 X10(3)/MCL (ref 140–371)
PMV BLD AUTO: 10.9 FL (ref 9.4–12.4)
POTASSIUM SERPL-SCNC: 4.9 MMOL/L (ref 3.5–5.1)
PROT UR QL STRIP: NEGATIVE
PROT UR STRIP-MCNC: 6 MG/DL
PTH-INTACT SERPL-MCNC: 82.7 PG/ML (ref 14–73)
RBC # BLD AUTO: 4.05 X10(6)/MCL (ref 4–5.1)
RBC #/AREA URNS AUTO: ABNORMAL /HPF
SODIUM SERPL-SCNC: 140 MMOL/L (ref 136–145)
SP GR UR STRIP.AUTO: 1.01 (ref 1–1.03)
SQUAMOUS #/AREA URNS AUTO: ABNORMAL /HPF
URINE PROTEIN/CREATININE RATIO (OLG): 0
UROBILINOGEN UR STRIP-ACNC: 0.2
WBC # BLD AUTO: 10.13 X10(3)/MCL (ref 4–11.5)
WBC #/AREA URNS AUTO: ABNORMAL /HPF

## 2024-10-01 PROCEDURE — 83970 ASSAY OF PARATHORMONE: CPT

## 2024-10-01 PROCEDURE — 80069 RENAL FUNCTION PANEL: CPT

## 2024-10-01 PROCEDURE — 81015 MICROSCOPIC EXAM OF URINE: CPT

## 2024-10-01 PROCEDURE — 84156 ASSAY OF PROTEIN URINE: CPT

## 2024-10-01 PROCEDURE — 36415 COLL VENOUS BLD VENIPUNCTURE: CPT

## 2024-10-01 PROCEDURE — 81003 URINALYSIS AUTO W/O SCOPE: CPT

## 2024-10-01 PROCEDURE — 85025 COMPLETE CBC W/AUTO DIFF WBC: CPT

## 2024-10-01 PROCEDURE — 82570 ASSAY OF URINE CREATININE: CPT

## 2024-10-01 PROCEDURE — 87086 URINE CULTURE/COLONY COUNT: CPT

## 2024-10-04 DIAGNOSIS — G20.A2 PARKINSON'S DISEASE WITHOUT DYSKINESIA, WITH FLUCTUATING MANIFESTATIONS: Primary | ICD-10-CM

## 2024-10-04 LAB — BACTERIA UR CULT: NORMAL

## 2024-10-04 RX ORDER — CARBIDOPA AND LEVODOPA 25; 100 MG/1; MG/1
1-2 TABLET ORAL 3 TIMES DAILY
Qty: 180 TABLET | Refills: 2 | Status: SHIPPED | OUTPATIENT
Start: 2024-10-04 | End: 2025-10-04

## 2024-10-07 DIAGNOSIS — G20.A2 PARKINSON'S DISEASE WITHOUT DYSKINESIA, WITH FLUCTUATING MANIFESTATIONS: Primary | ICD-10-CM

## 2024-10-07 RX ORDER — CARBIDOPA AND LEVODOPA 10; 100 MG/1; MG/1
TABLET ORAL
Qty: 150 TABLET | Refills: 2 | Status: SHIPPED | OUTPATIENT
Start: 2024-10-07

## 2024-10-28 ENCOUNTER — OFFICE VISIT (OUTPATIENT)
Dept: NEUROLOGY | Facility: CLINIC | Age: 84
End: 2024-10-28
Payer: MEDICARE

## 2024-10-28 DIAGNOSIS — G20.A2 PARKINSON'S DISEASE WITHOUT DYSKINESIA, WITH FLUCTUATING MANIFESTATIONS: Primary | ICD-10-CM

## 2024-10-28 PROCEDURE — 99213 OFFICE O/P EST LOW 20 MIN: CPT | Mod: 95,,, | Performed by: SPECIALIST

## 2025-01-09 ENCOUNTER — TELEPHONE (OUTPATIENT)
Dept: NEUROLOGY | Facility: CLINIC | Age: 85
End: 2025-01-09
Payer: MEDICARE

## 2025-01-09 NOTE — TELEPHONE ENCOUNTER
Pt has been having increased drooling at hs. Or the last few days. Pt c/o it is painful. Please advise. Ph# [phone]

## 2025-02-21 DIAGNOSIS — G20.A2 PARKINSON'S DISEASE WITHOUT DYSKINESIA, WITH FLUCTUATING MANIFESTATIONS: Primary | ICD-10-CM

## 2025-02-24 RX ORDER — CARBIDOPA AND LEVODOPA 10; 100 MG/1; MG/1
TABLET ORAL
Qty: 150 TABLET | Refills: 0 | Status: SHIPPED | OUTPATIENT
Start: 2025-02-24

## 2025-03-21 DIAGNOSIS — G20.A2 PARKINSON'S DISEASE WITHOUT DYSKINESIA, WITH FLUCTUATING MANIFESTATIONS: Primary | ICD-10-CM

## 2025-03-21 RX ORDER — CARBIDOPA AND LEVODOPA 10; 100 MG/1; MG/1
TABLET ORAL
Qty: 150 TABLET | Refills: 0 | Status: SHIPPED | OUTPATIENT
Start: 2025-03-21

## 2025-04-17 DIAGNOSIS — G20.A2 PARKINSON'S DISEASE WITHOUT DYSKINESIA, WITH FLUCTUATING MANIFESTATIONS: Primary | ICD-10-CM

## 2025-04-17 RX ORDER — CARBIDOPA AND LEVODOPA 10; 100 MG/1; MG/1
TABLET ORAL
Qty: 150 TABLET | Refills: 0 | Status: SHIPPED | OUTPATIENT
Start: 2025-04-17

## 2025-04-23 ENCOUNTER — OFFICE VISIT (OUTPATIENT)
Dept: NEUROLOGY | Facility: CLINIC | Age: 85
End: 2025-04-23
Payer: MEDICARE

## 2025-04-23 VITALS
SYSTOLIC BLOOD PRESSURE: 150 MMHG | WEIGHT: 115 LBS | DIASTOLIC BLOOD PRESSURE: 78 MMHG | HEIGHT: 64 IN | BODY MASS INDEX: 19.63 KG/M2

## 2025-04-23 DIAGNOSIS — G20.A2 PARKINSON'S DISEASE WITHOUT DYSKINESIA, WITH FLUCTUATING MANIFESTATIONS: ICD-10-CM

## 2025-04-23 PROCEDURE — 99213 OFFICE O/P EST LOW 20 MIN: CPT | Mod: S$PBB,,, | Performed by: SPECIALIST

## 2025-04-23 PROCEDURE — 99213 OFFICE O/P EST LOW 20 MIN: CPT | Mod: PBBFAC | Performed by: SPECIALIST

## 2025-04-23 PROCEDURE — 99999 PR PBB SHADOW E&M-EST. PATIENT-LVL III: CPT | Mod: PBBFAC,,, | Performed by: SPECIALIST

## 2025-04-23 RX ORDER — DIVALPROEX SODIUM 500 MG/1
500 TABLET, DELAYED RELEASE ORAL NIGHTLY
COMMUNITY
Start: 2025-01-23

## 2025-04-23 NOTE — PROGRESS NOTES
Subjective:     Patient ID: Jennifer Rob is a 84 y.o. female.    Chief Complaint: PD/parkinsonism follow up   HPI:           parkinson's (Stated that L hand tremors are not that bad. Daughter stated that medicine is working well. Does have a freezing fait when she begins to walk. Drools @ night. Denies vivid dreams /hallucinations. Daughter reports that the pt has a shuffling gait. No longer drives, lives w/ daughter. Needs assistance w/ getting in and out the bath tub, food is prepared for her but she can eat with no assistance. Reports a decrease in voice strength. Physical therapy visits 2x/week.)      notes may also be on facesheet for HPI, ROS, and other sections     Neurological ROS:   Sleep:  RBD:   Falls:  Hallucinations:       Medication List with Changes/Refills   Current Medications    AMLODIPINE (NORVASC) 2.5 MG TABLET    Take 2.5 mg by mouth.    ASPIRIN (ECOTRIN) 81 MG EC TABLET    Take 81 mg by mouth once daily.    CARBIDOPA-LEVODOPA  MG (SINEMET)  MG PER TABLET    TAKE 2 TABLETS BY MOUTH ONCE DAILY IN THE MORNING IN  THE  EARLY  MORNING,  AND  2  TABS  AT  NOON  AND  1  TAB  IN  THE  EVENING    CARBIDOPA-LEVODOPA  MG (SINEMET)  MG PER TABLET    Take 1-2 tablets by mouth 3 (three) times daily. 2 tab 730 2 tab 1 pm and 1 tab at 5    CHOLESTYRAMINE (QUESTRAN) 4 GRAM PACKET    Take by mouth.    CYANOCOBALAMIN, VITAMIN B-12, INJ    Inject as directed.    DEPAKOTE  MG 24 HR TABLET    Take 250 mg by mouth every evening.    DIVALPROEX (DEPAKOTE) 500 MG TBEC    Take 500 mg by mouth nightly.    FERROUS SULFATE 325 (65 FE) MG EC TABLET    Take 325 mg by mouth. Weekly    LORAZEPAM (ATIVAN) 1 MG TABLET    Take 0.5-1 mg by mouth 2 (two) times daily.    MAGNESIUM 30 MG TAB    Take by mouth once.    MAGNESIUM OXIDE (MAG-OX) 400 MG (241.3 MG MAGNESIUM) TABLET    Take 400 mg by mouth once daily.    METOPROLOL SUCCINATE (TOPROL-XL) 25 MG 24 HR TABLET    Take 25 mg by mouth once daily.  "   OLMESARTAN (BENICAR) 20 MG TABLET    Take 20 mg by mouth once daily.    VENLAFAXINE (EFFEXOR-XR) 75 MG 24 HR CAPSULE    Take by mouth.    VITAMIN D3 25 MCG (1,000 UNIT) CHEW    Take 1 tablet by mouth 2 (two) times daily.    VITAMIN E 1000 UNIT CAPSULE    Take 1,000 Units by mouth once daily.      Objective:   If Accompanied, by: dtr    Exam  BP (!) 150/78 (BP Location: Right arm, Patient Position: Sitting)   Ht 5' 4" (1.626 m)   Wt 52.2 kg (115 lb)   BMI 19.74 kg/m²     Heart:   Extr:    Speech:  Minimal   EOMs:   Gait:  rollator but walked for me without it   some pisa of shoulders      Tremor:  none seen   Bradykinesia: Seen   Dyskinesias: none     Neuroimaging:        Assessment/Plan:       ICD-10-CM ICD-9-CM   1. Parkinson's disease without dyskinesia, with fluctuating manifestations  G20.A2 332.0     Other comments/ follow up:    *Parkinson's medications can be associated with certain side effects. Nausea and abdominal symptoms typically improve in time.  Impulse control disorders including persistent thoughts or behaviors involving shopping gambling or sex can be problematic.  Excessive daytime sleepiness including car crashes have been reported. Delusions hallucinations and paranoia can also occur, typically with higher doses in older patients.   Abrupt stoppage of high dose parkinson's medications can be medically troublesome.    No orders of the defined types were placed in this encounter.               "

## 2025-06-28 ENCOUNTER — HOSPITAL ENCOUNTER (INPATIENT)
Facility: HOSPITAL | Age: 85
LOS: 7 days | Discharge: HOME | DRG: 872 | End: 2025-07-05
Attending: INTERNAL MEDICINE | Admitting: INTERNAL MEDICINE
Payer: MEDICARE
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
## 2025-06-28 DIAGNOSIS — A41.9 SEPSIS, DUE TO UNSPECIFIED ORGANISM, UNSPECIFIED WHETHER ACUTE ORGAN DYSFUNCTION PRESENT: Primary | ICD-10-CM

## 2025-06-28 DIAGNOSIS — R07.9 CHEST PAIN: ICD-10-CM

## 2025-06-28 DIAGNOSIS — G20.C PARKINSONISM, UNSPECIFIED PARKINSONISM TYPE: ICD-10-CM

## 2025-06-28 DIAGNOSIS — G20.A2 PARKINSON'S DISEASE WITHOUT DYSKINESIA, WITH FLUCTUATING MANIFESTATIONS: ICD-10-CM

## 2025-06-28 DIAGNOSIS — L03.116 LEFT LEG CELLULITIS: ICD-10-CM

## 2025-06-28 PROBLEM — E83.39 HYPOPHOSPHATEMIA: Status: ACTIVE | Noted: 2025-06-28

## 2025-06-28 PROBLEM — L03.90 CELLULITIS: Status: ACTIVE | Noted: 2025-06-28

## 2025-06-28 PROBLEM — E83.42 HYPOMAGNESEMIA: Status: ACTIVE | Noted: 2025-06-28

## 2025-06-28 PROBLEM — R65.20 SEVERE SEPSIS: Status: ACTIVE | Noted: 2025-06-28

## 2025-06-28 LAB
ALBUMIN SERPL-MCNC: 4.2 G/DL (ref 3.4–5)
ALBUMIN/GLOB SERPL: 1.4 RATIO
ALP SERPL-CCNC: 62 UNIT/L (ref 50–144)
ALT SERPL-CCNC: 13 UNIT/L (ref 1–45)
ANION GAP SERPL CALC-SCNC: 6 MEQ/L (ref 2–13)
APTT PPP: 25 SECONDS (ref 23–29.4)
AST SERPL-CCNC: 27 UNIT/L (ref 14–36)
BACTERIA #/AREA URNS AUTO: NORMAL /HPF
BASOPHILS # BLD AUTO: 0.04 X10(3)/MCL (ref 0.01–0.08)
BASOPHILS NFR BLD AUTO: 0.2 % (ref 0.1–1.2)
BILIRUB SERPL-MCNC: 0.6 MG/DL (ref 0–1)
BILIRUB UR QL STRIP.AUTO: NEGATIVE
BNP BLD-MCNC: 2670 PG/ML (ref 0–124.9)
BUN SERPL-MCNC: 25 MG/DL (ref 7–20)
CALCIUM SERPL-MCNC: 8.9 MG/DL (ref 8.4–10.2)
CHLORIDE SERPL-SCNC: 98 MMOL/L (ref 98–110)
CLARITY UR: CLEAR
CO2 SERPL-SCNC: 27 MMOL/L (ref 21–32)
COLOR UR AUTO: YELLOW
CREAT SERPL-MCNC: 1.09 MG/DL (ref 0.66–1.25)
CREAT/UREA NIT SERPL: 23 (ref 12–20)
EOSINOPHIL # BLD AUTO: 0 X10(3)/MCL (ref 0.04–0.36)
EOSINOPHIL NFR BLD AUTO: 0 % (ref 0.7–7)
ERYTHROCYTE [DISTWIDTH] IN BLOOD BY AUTOMATED COUNT: 12.8 % (ref 11–14.5)
FLUAV AG UPPER RESP QL IA.RAPID: NOT DETECTED
FLUBV AG UPPER RESP QL IA.RAPID: NOT DETECTED
GFR SERPLBLD CREATININE-BSD FMLA CKD-EPI: 50 ML/MIN/1.73/M2
GLOBULIN SER-MCNC: 3.1 GM/DL (ref 2–3.9)
GLUCOSE SERPL-MCNC: 158 MG/DL (ref 70–115)
GLUCOSE UR QL STRIP: NEGATIVE
HCT VFR BLD AUTO: 35.5 % (ref 36–48)
HGB BLD-MCNC: 11.8 G/DL (ref 11.8–16)
HGB UR QL STRIP: ABNORMAL
IMM GRANULOCYTES # BLD AUTO: 0.3 X10(3)/MCL (ref 0–0.03)
IMM GRANULOCYTES NFR BLD AUTO: 1.4 % (ref 0–0.5)
INR PPP: 1
KETONES UR QL STRIP: NEGATIVE
LACTATE SERPL-SCNC: 1.3 MMOL/L (ref 0.4–2)
LACTATE SERPL-SCNC: 2.5 MMOL/L (ref 0.4–2)
LEUKOCYTE ESTERASE UR QL STRIP: ABNORMAL
LYMPHOCYTES # BLD AUTO: 1.42 X10(3)/MCL (ref 1.16–3.74)
LYMPHOCYTES NFR BLD AUTO: 6.8 % (ref 20–55)
MAGNESIUM SERPL-MCNC: 1.7 MG/DL (ref 1.8–2.4)
MCH RBC QN AUTO: 32.4 PG (ref 27–34)
MCHC RBC AUTO-ENTMCNC: 33.2 G/DL (ref 31–37)
MCV RBC AUTO: 97.5 FL (ref 79–99)
MONOCYTES # BLD AUTO: 1.66 X10(3)/MCL (ref 0.24–0.36)
MONOCYTES NFR BLD AUTO: 8 % (ref 4.7–12.5)
NEUTROPHILS # BLD AUTO: 17.42 X10(3)/MCL (ref 1.56–6.13)
NEUTROPHILS NFR BLD AUTO: 83.6 % (ref 37–73)
NITRITE UR QL STRIP: NEGATIVE
NRBC BLD AUTO-RTO: 0 %
PH UR STRIP: 7.5 [PH]
PHOSPHATE SERPL-MCNC: 2.3 MG/DL (ref 2.5–4.9)
PLATELET # BLD AUTO: 176 X10(3)/MCL (ref 140–371)
PMV BLD AUTO: 11.1 FL (ref 9.4–12.4)
POTASSIUM SERPL-SCNC: 4.4 MMOL/L (ref 3.5–5.1)
PROT SERPL-MCNC: 7.3 GM/DL (ref 6.3–8.2)
PROT UR QL STRIP: NEGATIVE
PROTHROMBIN TIME: 10.5 SECONDS (ref 9.3–11.9)
RBC # BLD AUTO: 3.64 X10(6)/MCL (ref 4–5.1)
RBC #/AREA URNS AUTO: NORMAL /HPF
SARS-COV-2 RNA RESP QL NAA+PROBE: NOT DETECTED
SODIUM SERPL-SCNC: 131 MMOL/L (ref 136–145)
SP GR UR STRIP.AUTO: 1.01 (ref 1–1.03)
SQUAMOUS #/AREA URNS AUTO: NORMAL /HPF
STREP A PCR (OHS): NOT DETECTED
TROPONIN I SERPL-MCNC: 0.02 NG/ML (ref 0–0.03)
UROBILINOGEN UR STRIP-ACNC: 0.2
WBC # BLD AUTO: 20.84 X10(3)/MCL (ref 4–11.5)
WBC #/AREA URNS AUTO: NORMAL /HPF

## 2025-06-28 PROCEDURE — 87651 STREP A DNA AMP PROBE: CPT | Performed by: INTERNAL MEDICINE

## 2025-06-28 PROCEDURE — 85730 THROMBOPLASTIN TIME PARTIAL: CPT | Performed by: INTERNAL MEDICINE

## 2025-06-28 PROCEDURE — 81003 URINALYSIS AUTO W/O SCOPE: CPT | Performed by: INTERNAL MEDICINE

## 2025-06-28 PROCEDURE — 0240U COVID/FLU A&B PCR: CPT | Performed by: INTERNAL MEDICINE

## 2025-06-28 PROCEDURE — 85025 COMPLETE CBC W/AUTO DIFF WBC: CPT | Performed by: INTERNAL MEDICINE

## 2025-06-28 PROCEDURE — 93005 ELECTROCARDIOGRAM TRACING: CPT

## 2025-06-28 PROCEDURE — 63600175 PHARM REV CODE 636 W HCPCS: Performed by: INTERNAL MEDICINE

## 2025-06-28 PROCEDURE — 83735 ASSAY OF MAGNESIUM: CPT | Performed by: INTERNAL MEDICINE

## 2025-06-28 PROCEDURE — 87040 BLOOD CULTURE FOR BACTERIA: CPT | Performed by: INTERNAL MEDICINE

## 2025-06-28 PROCEDURE — 25000003 PHARM REV CODE 250: Performed by: INTERNAL MEDICINE

## 2025-06-28 PROCEDURE — 96375 TX/PRO/DX INJ NEW DRUG ADDON: CPT

## 2025-06-28 PROCEDURE — 84484 ASSAY OF TROPONIN QUANT: CPT | Performed by: INTERNAL MEDICINE

## 2025-06-28 PROCEDURE — 80053 COMPREHEN METABOLIC PANEL: CPT | Performed by: INTERNAL MEDICINE

## 2025-06-28 PROCEDURE — 96367 TX/PROPH/DG ADDL SEQ IV INF: CPT

## 2025-06-28 PROCEDURE — 99285 EMERGENCY DEPT VISIT HI MDM: CPT | Mod: 25

## 2025-06-28 PROCEDURE — 96365 THER/PROPH/DIAG IV INF INIT: CPT

## 2025-06-28 PROCEDURE — 84100 ASSAY OF PHOSPHORUS: CPT | Performed by: INTERNAL MEDICINE

## 2025-06-28 PROCEDURE — 83880 ASSAY OF NATRIURETIC PEPTIDE: CPT | Performed by: INTERNAL MEDICINE

## 2025-06-28 PROCEDURE — 85610 PROTHROMBIN TIME: CPT | Performed by: INTERNAL MEDICINE

## 2025-06-28 PROCEDURE — 81015 MICROSCOPIC EXAM OF URINE: CPT | Mod: XB | Performed by: INTERNAL MEDICINE

## 2025-06-28 PROCEDURE — 11000001 HC ACUTE MED/SURG PRIVATE ROOM

## 2025-06-28 PROCEDURE — 21400001 HC TELEMETRY ROOM

## 2025-06-28 PROCEDURE — 93010 ELECTROCARDIOGRAM REPORT: CPT | Mod: ,,, | Performed by: INTERNAL MEDICINE

## 2025-06-28 PROCEDURE — 83605 ASSAY OF LACTIC ACID: CPT | Performed by: INTERNAL MEDICINE

## 2025-06-28 RX ORDER — LANOLIN ALCOHOL/MO/W.PET/CERES
400 CREAM (GRAM) TOPICAL ONCE
Status: COMPLETED | OUTPATIENT
Start: 2025-06-28 | End: 2025-06-28

## 2025-06-28 RX ORDER — MAGNESIUM SULFATE HEPTAHYDRATE 40 MG/ML
2 INJECTION, SOLUTION INTRAVENOUS ONCE
Status: COMPLETED | OUTPATIENT
Start: 2025-06-28 | End: 2025-06-29

## 2025-06-28 RX ORDER — LORAZEPAM 0.5 MG/1
0.5 TABLET ORAL
Status: COMPLETED | OUTPATIENT
Start: 2025-06-28 | End: 2025-06-28

## 2025-06-28 RX ORDER — ENOXAPARIN SODIUM 100 MG/ML
40 INJECTION SUBCUTANEOUS EVERY 24 HOURS
Status: DISCONTINUED | OUTPATIENT
Start: 2025-06-28 | End: 2025-07-04

## 2025-06-28 RX ORDER — ONDANSETRON HYDROCHLORIDE 2 MG/ML
4 INJECTION, SOLUTION INTRAVENOUS
Status: COMPLETED | OUTPATIENT
Start: 2025-06-28 | End: 2025-06-28

## 2025-06-28 RX ORDER — LANOLIN ALCOHOL/MO/W.PET/CERES
400 CREAM (GRAM) TOPICAL ONCE
Status: DISCONTINUED | OUTPATIENT
Start: 2025-06-28 | End: 2025-06-28

## 2025-06-28 RX ORDER — ACETAMINOPHEN 325 MG/1
650 TABLET ORAL
Status: COMPLETED | OUTPATIENT
Start: 2025-06-28 | End: 2025-06-28

## 2025-06-28 RX ORDER — SODIUM CHLORIDE 0.9 % (FLUSH) 0.9 %
10 SYRINGE (ML) INJECTION EVERY 12 HOURS PRN
Status: DISCONTINUED | OUTPATIENT
Start: 2025-06-28 | End: 2025-07-05 | Stop reason: HOSPADM

## 2025-06-28 RX ORDER — IBUPROFEN 200 MG
16 TABLET ORAL
Status: DISCONTINUED | OUTPATIENT
Start: 2025-06-28 | End: 2025-07-05 | Stop reason: HOSPADM

## 2025-06-28 RX ORDER — ACETAMINOPHEN 325 MG/1
650 TABLET ORAL EVERY 4 HOURS PRN
Status: DISCONTINUED | OUTPATIENT
Start: 2025-06-28 | End: 2025-07-05 | Stop reason: HOSPADM

## 2025-06-28 RX ORDER — ONDANSETRON HYDROCHLORIDE 2 MG/ML
4 INJECTION, SOLUTION INTRAVENOUS EVERY 8 HOURS PRN
Status: DISCONTINUED | OUTPATIENT
Start: 2025-06-28 | End: 2025-07-05 | Stop reason: HOSPADM

## 2025-06-28 RX ORDER — GLUCAGON 1 MG
1 KIT INJECTION
Status: DISCONTINUED | OUTPATIENT
Start: 2025-06-28 | End: 2025-07-05 | Stop reason: HOSPADM

## 2025-06-28 RX ORDER — ACETAMINOPHEN 325 MG/1
650 TABLET ORAL EVERY 8 HOURS PRN
Status: DISCONTINUED | OUTPATIENT
Start: 2025-06-28 | End: 2025-07-05 | Stop reason: HOSPADM

## 2025-06-28 RX ORDER — NALOXONE HCL 0.4 MG/ML
0.02 VIAL (ML) INJECTION
Status: DISCONTINUED | OUTPATIENT
Start: 2025-06-28 | End: 2025-07-05 | Stop reason: HOSPADM

## 2025-06-28 RX ORDER — IBUPROFEN 200 MG
24 TABLET ORAL
Status: DISCONTINUED | OUTPATIENT
Start: 2025-06-28 | End: 2025-07-05 | Stop reason: HOSPADM

## 2025-06-28 RX ORDER — SODIUM CHLORIDE 9 MG/ML
INJECTION, SOLUTION INTRAVENOUS CONTINUOUS
Status: DISCONTINUED | OUTPATIENT
Start: 2025-06-28 | End: 2025-07-03

## 2025-06-28 RX ADMIN — SODIUM CHLORIDE 1641 ML: 9 INJECTION, SOLUTION INTRAVENOUS at 09:06

## 2025-06-28 RX ADMIN — ENOXAPARIN SODIUM 40 MG: 40 INJECTION SUBCUTANEOUS at 10:06

## 2025-06-28 RX ADMIN — SODIUM CHLORIDE 1000 MG: 9 INJECTION, SOLUTION INTRAVENOUS at 09:06

## 2025-06-28 RX ADMIN — ONDANSETRON 4 MG: 2 INJECTION INTRAMUSCULAR; INTRAVENOUS at 08:06

## 2025-06-28 RX ADMIN — SODIUM CHLORIDE 1000 ML: 9 INJECTION, SOLUTION INTRAVENOUS at 08:06

## 2025-06-28 RX ADMIN — ACETAMINOPHEN 650 MG: 325 TABLET, FILM COATED ORAL at 08:06

## 2025-06-28 RX ADMIN — PIPERACILLIN AND TAZOBACTAM 4.5 G: 4; .5 INJECTION, POWDER, FOR SOLUTION INTRAVENOUS; PARENTERAL at 08:06

## 2025-06-28 RX ADMIN — Medication 400 MG: at 09:06

## 2025-06-28 RX ADMIN — LORAZEPAM 0.5 MG: 0.5 TABLET ORAL at 09:06

## 2025-06-28 RX ADMIN — SODIUM CHLORIDE: 9 INJECTION, SOLUTION INTRAVENOUS at 10:06

## 2025-06-29 PROBLEM — I10 PRIMARY HYPERTENSION: Status: ACTIVE | Noted: 2025-06-29

## 2025-06-29 PROBLEM — D69.6 THROMBOCYTOPENIA: Status: ACTIVE | Noted: 2025-06-29

## 2025-06-29 PROBLEM — E87.1 HYPONATREMIA: Status: ACTIVE | Noted: 2025-06-29

## 2025-06-29 PROBLEM — D64.9 ANEMIA: Status: ACTIVE | Noted: 2025-06-29

## 2025-06-29 LAB
ALBUMIN SERPL-MCNC: 3.2 G/DL (ref 3.4–5)
ALBUMIN/GLOB SERPL: 1.2 RATIO
ALP SERPL-CCNC: 56 UNIT/L (ref 50–144)
ALT SERPL-CCNC: 12 UNIT/L (ref 1–45)
ANION GAP SERPL CALC-SCNC: 3 MEQ/L (ref 2–13)
AST SERPL-CCNC: 32 UNIT/L (ref 14–36)
BASOPHILS # BLD AUTO: 0.04 X10(3)/MCL (ref 0.01–0.08)
BASOPHILS NFR BLD AUTO: 0.2 % (ref 0.1–1.2)
BILIRUB SERPL-MCNC: 0.4 MG/DL (ref 0–1)
BUN SERPL-MCNC: 17 MG/DL (ref 7–20)
CALCIUM SERPL-MCNC: 8.2 MG/DL (ref 8.4–10.2)
CHLORIDE SERPL-SCNC: 108 MMOL/L (ref 98–110)
CO2 SERPL-SCNC: 28 MMOL/L (ref 21–32)
CREAT SERPL-MCNC: 1.12 MG/DL (ref 0.66–1.25)
CREAT/UREA NIT SERPL: 15 (ref 12–20)
EOSINOPHIL # BLD AUTO: 0.04 X10(3)/MCL (ref 0.04–0.36)
EOSINOPHIL NFR BLD AUTO: 0.2 % (ref 0.7–7)
ERYTHROCYTE [DISTWIDTH] IN BLOOD BY AUTOMATED COUNT: 13 % (ref 11–14.5)
GFR SERPLBLD CREATININE-BSD FMLA CKD-EPI: 48 ML/MIN/1.73/M2
GLOBULIN SER-MCNC: 2.7 GM/DL (ref 2–3.9)
GLUCOSE SERPL-MCNC: 97 MG/DL (ref 70–115)
HCT VFR BLD AUTO: 35.2 % (ref 36–48)
HGB BLD-MCNC: 11.5 G/DL (ref 11.8–16)
IMM GRANULOCYTES # BLD AUTO: 0.09 X10(3)/MCL (ref 0–0.03)
IMM GRANULOCYTES NFR BLD AUTO: 0.6 % (ref 0–0.5)
LYMPHOCYTES # BLD AUTO: 1.33 X10(3)/MCL (ref 1.16–3.74)
LYMPHOCYTES NFR BLD AUTO: 8.3 % (ref 20–55)
MCH RBC QN AUTO: 32.4 PG (ref 27–34)
MCHC RBC AUTO-ENTMCNC: 32.7 G/DL (ref 31–37)
MCV RBC AUTO: 99.2 FL (ref 79–99)
MONOCYTES # BLD AUTO: 1.12 X10(3)/MCL (ref 0.24–0.36)
MONOCYTES NFR BLD AUTO: 7 % (ref 4.7–12.5)
NEUTROPHILS # BLD AUTO: 13.43 X10(3)/MCL (ref 1.56–6.13)
NEUTROPHILS NFR BLD AUTO: 83.7 % (ref 37–73)
NRBC BLD AUTO-RTO: 0 %
PLATELET # BLD AUTO: 146 X10(3)/MCL (ref 140–371)
PMV BLD AUTO: 10.6 FL (ref 9.4–12.4)
POTASSIUM SERPL-SCNC: 4.2 MMOL/L (ref 3.5–5.1)
PROT SERPL-MCNC: 5.9 GM/DL (ref 6.3–8.2)
RBC # BLD AUTO: 3.55 X10(6)/MCL (ref 4–5.1)
SODIUM SERPL-SCNC: 139 MMOL/L (ref 136–145)
WBC # BLD AUTO: 16.05 X10(3)/MCL (ref 4–11.5)

## 2025-06-29 PROCEDURE — 11000001 HC ACUTE MED/SURG PRIVATE ROOM

## 2025-06-29 PROCEDURE — 36415 COLL VENOUS BLD VENIPUNCTURE: CPT | Performed by: INTERNAL MEDICINE

## 2025-06-29 PROCEDURE — 63600175 PHARM REV CODE 636 W HCPCS: Performed by: INTERNAL MEDICINE

## 2025-06-29 PROCEDURE — 25000003 PHARM REV CODE 250: Performed by: FAMILY MEDICINE

## 2025-06-29 PROCEDURE — 25000003 PHARM REV CODE 250: Performed by: INTERNAL MEDICINE

## 2025-06-29 PROCEDURE — 63600175 PHARM REV CODE 636 W HCPCS: Performed by: FAMILY MEDICINE

## 2025-06-29 PROCEDURE — 94761 N-INVAS EAR/PLS OXIMETRY MLT: CPT

## 2025-06-29 PROCEDURE — 80053 COMPREHEN METABOLIC PANEL: CPT | Performed by: INTERNAL MEDICINE

## 2025-06-29 PROCEDURE — 85025 COMPLETE CBC W/AUTO DIFF WBC: CPT | Performed by: INTERNAL MEDICINE

## 2025-06-29 PROCEDURE — 21400001 HC TELEMETRY ROOM

## 2025-06-29 RX ORDER — CARBIDOPA AND LEVODOPA 25; 100 MG/1; MG/1
1 TABLET, EXTENDED RELEASE ORAL ONCE
Status: COMPLETED | OUTPATIENT
Start: 2025-06-29 | End: 2025-06-29

## 2025-06-29 RX ORDER — ASPIRIN 81 MG/1
81 TABLET ORAL EVERY OTHER DAY
Status: DISCONTINUED | OUTPATIENT
Start: 2025-06-30 | End: 2025-07-05 | Stop reason: HOSPADM

## 2025-06-29 RX ORDER — LORAZEPAM 0.5 MG/1
0.5 TABLET ORAL 2 TIMES DAILY PRN
Status: DISCONTINUED | OUTPATIENT
Start: 2025-06-29 | End: 2025-07-05 | Stop reason: HOSPADM

## 2025-06-29 RX ORDER — CARBIDOPA AND LEVODOPA 25; 100 MG/1; MG/1
1 TABLET ORAL 3 TIMES DAILY
Status: DISCONTINUED | OUTPATIENT
Start: 2025-06-29 | End: 2025-07-05 | Stop reason: HOSPADM

## 2025-06-29 RX ORDER — CARBIDOPA AND LEVODOPA 10; 100 MG/1; MG/1
1 TABLET ORAL 4 TIMES DAILY
Status: DISCONTINUED | OUTPATIENT
Start: 2025-06-29 | End: 2025-07-05 | Stop reason: HOSPADM

## 2025-06-29 RX ORDER — AMLODIPINE BESYLATE 2.5 MG/1
2.5 TABLET ORAL NIGHTLY
Status: DISCONTINUED | OUTPATIENT
Start: 2025-06-29 | End: 2025-07-05 | Stop reason: HOSPADM

## 2025-06-29 RX ORDER — LOSARTAN POTASSIUM 50 MG/1
50 TABLET ORAL DAILY
Status: DISCONTINUED | OUTPATIENT
Start: 2025-06-29 | End: 2025-07-05 | Stop reason: HOSPADM

## 2025-06-29 RX ORDER — METOPROLOL SUCCINATE 25 MG/1
25 TABLET, EXTENDED RELEASE ORAL DAILY
Status: DISCONTINUED | OUTPATIENT
Start: 2025-06-29 | End: 2025-07-05 | Stop reason: HOSPADM

## 2025-06-29 RX ORDER — LANOLIN ALCOHOL/MO/W.PET/CERES
1 CREAM (GRAM) TOPICAL DAILY
Status: DISCONTINUED | OUTPATIENT
Start: 2025-06-29 | End: 2025-07-05 | Stop reason: HOSPADM

## 2025-06-29 RX ORDER — VENLAFAXINE HYDROCHLORIDE 75 MG/1
75 CAPSULE, EXTENDED RELEASE ORAL DAILY
Status: DISCONTINUED | OUTPATIENT
Start: 2025-06-29 | End: 2025-07-05 | Stop reason: HOSPADM

## 2025-06-29 RX ORDER — ASPIRIN 325 MG
50000 TABLET, DELAYED RELEASE (ENTERIC COATED) ORAL WEEKLY
Status: DISCONTINUED | OUTPATIENT
Start: 2025-06-29 | End: 2025-07-05 | Stop reason: HOSPADM

## 2025-06-29 RX ORDER — DIVALPROEX SODIUM 250 MG/1
500 TABLET, DELAYED RELEASE ORAL NIGHTLY
Status: DISCONTINUED | OUTPATIENT
Start: 2025-06-29 | End: 2025-07-05 | Stop reason: HOSPADM

## 2025-06-29 RX ORDER — AMLODIPINE BESYLATE 2.5 MG/1
2.5 TABLET ORAL DAILY
Status: DISCONTINUED | OUTPATIENT
Start: 2025-06-29 | End: 2025-06-29

## 2025-06-29 RX ADMIN — CARBIDOPA AND LEVODOPA 1 TABLET: 10; 100 TABLET ORAL at 01:06

## 2025-06-29 RX ADMIN — CARBIDOPA AND LEVODOPA 1 TABLET: 10; 100 TABLET ORAL at 04:06

## 2025-06-29 RX ADMIN — METOPROLOL SUCCINATE 25 MG: 25 TABLET, EXTENDED RELEASE ORAL at 12:06

## 2025-06-29 RX ADMIN — MAGNESIUM SULFATE HEPTAHYDRATE 2 G: 40 INJECTION, SOLUTION INTRAVENOUS at 12:06

## 2025-06-29 RX ADMIN — CARBIDOPA AND LEVODOPA 1 TABLET: 25; 100 TABLET ORAL at 04:06

## 2025-06-29 RX ADMIN — CARBIDOPA AND LEVODOPA 1 TABLET: 10; 100 TABLET ORAL at 08:06

## 2025-06-29 RX ADMIN — PIPERACILLIN AND TAZOBACTAM 4.5 G: 4; .5 INJECTION, POWDER, FOR SOLUTION INTRAVENOUS; PARENTERAL at 08:06

## 2025-06-29 RX ADMIN — ACETAMINOPHEN 650 MG: 325 TABLET, FILM COATED ORAL at 09:06

## 2025-06-29 RX ADMIN — ENOXAPARIN SODIUM 40 MG: 40 INJECTION SUBCUTANEOUS at 04:06

## 2025-06-29 RX ADMIN — SODIUM CHLORIDE: 9 INJECTION, SOLUTION INTRAVENOUS at 08:06

## 2025-06-29 RX ADMIN — VENLAFAXINE HYDROCHLORIDE 75 MG: 75 CAPSULE, EXTENDED RELEASE ORAL at 12:06

## 2025-06-29 RX ADMIN — LORAZEPAM 0.5 MG: 0.5 TABLET ORAL at 08:06

## 2025-06-29 RX ADMIN — PIPERACILLIN AND TAZOBACTAM 4.5 G: 4; .5 INJECTION, POWDER, FOR SOLUTION INTRAVENOUS; PARENTERAL at 12:06

## 2025-06-29 RX ADMIN — FERROUS SULFATE TAB 325 MG (65 MG ELEMENTAL FE) 1 EACH: 325 (65 FE) TAB at 12:06

## 2025-06-29 RX ADMIN — SODIUM CHLORIDE: 9 INJECTION, SOLUTION INTRAVENOUS at 10:06

## 2025-06-29 RX ADMIN — CARBIDOPA AND LEVODOPA 1 TABLET: 25; 100 TABLET, EXTENDED RELEASE ORAL at 01:06

## 2025-06-29 RX ADMIN — AMLODIPINE BESYLATE 2.5 MG: 2.5 TABLET ORAL at 08:06

## 2025-06-29 RX ADMIN — LOSARTAN POTASSIUM 50 MG: 50 TABLET, FILM COATED ORAL at 12:06

## 2025-06-29 RX ADMIN — VANCOMYCIN HYDROCHLORIDE 750 MG: 750 INJECTION, POWDER, LYOPHILIZED, FOR SOLUTION INTRAVENOUS at 09:06

## 2025-06-29 RX ADMIN — PIPERACILLIN AND TAZOBACTAM 4.5 G: 4; .5 INJECTION, POWDER, FOR SOLUTION INTRAVENOUS; PARENTERAL at 05:06

## 2025-06-29 RX ADMIN — Medication 50000 UNITS: at 01:06

## 2025-06-29 RX ADMIN — DIVALPROEX SODIUM 500 MG: 250 TABLET, DELAYED RELEASE ORAL at 08:06

## 2025-06-29 RX ADMIN — SODIUM PHOSPHATE, MONOBASIC, MONOHYDRATE AND SODIUM PHOSPHATE, DIBASIC, ANHYDROUS 30 MMOL: 142; 276 INJECTION, SOLUTION INTRAVENOUS at 12:06

## 2025-06-29 NOTE — ED PROVIDER NOTES
Encounter Date: 6/28/2025       History     Chief Complaint   Patient presents with    Fever    Altered Mental Status    Fall     Family reports pt fell today, unwitnessed, wound to right elbow, pt reports she hit her head, also c/o LLE redness, swelling, warmth. Hx: parkinson's.     This is a 84-year-old female patient came into the emergency room with history of having a fall today that is unwitnessed at home after she tripped on the floor.  Apparently patient's family member got into the house after 30 minutes and found her on the floor.  Patient denies having any loss of consciousness, vomiting, seizures.  Patient does take aspirin at home.  Denies any chest pain shortness of breath or palpitations.  No abdominal pain or back pain.  Patient on arrival has a temperature of 102.4° F.  Denies cough, congestion.  No vomitings or diarrhea.  Patient does have left leg redness, warmth, swelling.  Does have underlying history of Parkinson's disease.        Review of patient's allergies indicates:   Allergen Reactions    Sulfa (sulfonamide antibiotics) Rash     unknown reaction.   has been years     Past Medical History:   Diagnosis Date    Bipolar disorder, unspecified     Hypertension      Past Surgical History:   Procedure Laterality Date    APPENDECTOMY      BREAST BIOPSY      CATARACT EXTRACTION      CHOLECYSTECTOMY      HYSTERECTOMY       Family History   Problem Relation Name Age of Onset    Seizures Father       Social History[1]  Review of Systems   Constitutional:  Positive for fatigue and fever. Negative for diaphoresis.   HENT:  Negative for drooling, ear discharge, ear pain, facial swelling and sore throat.    Eyes: Negative.  Negative for pain and itching.   Respiratory:  Negative for cough, shortness of breath, wheezing and stridor.    Cardiovascular: Negative.  Negative for chest pain, palpitations and leg swelling.   Gastrointestinal:  Negative for abdominal pain, diarrhea, nausea and vomiting.    Endocrine: Negative.    Genitourinary: Negative.  Negative for frequency, hematuria, menstrual problem and pelvic pain.   Musculoskeletal:  Negative for gait problem, joint swelling, myalgias and neck pain.   Skin:  Positive for color change and rash.        Left leg has redness, swelling, pain.   Neurological:  Negative for dizziness, light-headedness, numbness and headaches.   Psychiatric/Behavioral: Negative.  Negative for agitation, self-injury, sleep disturbance and suicidal ideas. The patient is not nervous/anxious.    All other systems reviewed and are negative.      Physical Exam     Initial Vitals [06/28/25 1950]   BP Pulse Resp Temp SpO2   (!) 164/74 95 18 (!) 102.4 °F (39.1 °C) 100 %      MAP       --         Physical Exam    Nursing note and vitals reviewed.  Constitutional: She appears well-developed and well-nourished.   HENT:   Head: Normocephalic and atraumatic.   Eyes: Conjunctivae and EOM are normal. Pupils are equal, round, and reactive to light.   Neck: Neck supple.   Normal range of motion.  Cardiovascular:  Normal rate, regular rhythm, normal heart sounds and intact distal pulses.           Pulmonary/Chest: No stridor. She has rhonchi. She has rales.   Abdominal: Abdomen is soft. Bowel sounds are normal. She exhibits no distension. There is no abdominal tenderness. There is no rebound.   Musculoskeletal:         General: Normal range of motion.      Cervical back: Normal range of motion and neck supple.     Neurological: She is alert and oriented to person, place, and time. GCS score is 15. GCS eye subscore is 4. GCS verbal subscore is 5. GCS motor subscore is 6.   Skin: Skin is warm. Capillary refill takes less than 2 seconds. Rash noted.   Psychiatric: She has a normal mood and affect.         ED Course   Procedures  Labs Reviewed   COMPREHENSIVE METABOLIC PANEL - Abnormal       Result Value    Sodium 131 (*)     Potassium 4.4      Chloride 98      CO2 27      Glucose 158 (*)     Blood Urea  Nitrogen 25 (*)     Creatinine 1.09      Calcium 8.9      Protein Total 7.3      Albumin 4.2      Globulin 3.1      Albumin/Globulin Ratio 1.4      Bilirubin Total 0.6      ALP 62      ALT 13      AST 27      eGFR 50      Anion Gap 6.0      BUN/Creatinine Ratio 23 (*)    MAGNESIUM - Abnormal    Magnesium Level 1.70 (*)    PHOSPHORUS - Abnormal    Phosphorus Level 2.3 (*)    URINALYSIS, REFLEX TO URINE CULTURE - Abnormal    Color, UA Yellow      Appearance, UA Clear      Specific Gravity, UA 1.010      pH, UA 7.5      Protein, UA Negative      Glucose, UA Negative      Ketones, UA Negative      Blood, UA Trace-Intact (*)     Bilirubin, UA Negative      Urobilinogen, UA 0.2      Nitrites, UA Negative      Leukocyte Esterase, UA Moderate (*)     Narrative:      URINE STABILITY IS 2 HOURS AT ROOM TEMP OR    SIX HOURS REFRIGERATED. PERFORMING TESTING ON    SPECIMENS GREATER THAN THIS AGE MAY AFFECT THE    FOLLOWING TESTS:    PH          SPECIFIC GRAVITY           BLOOD    CLARITY     BILIRUBIN               UROBILINOGEN   CBC WITH DIFFERENTIAL - Abnormal    WBC 20.84 (*)     RBC 3.64 (*)     Hgb 11.8      Hct 35.5 (*)     MCV 97.5      MCH 32.4      MCHC 33.2      RDW 12.8      Platelet 176      MPV 11.1      Neut % 83.6 (*)     Lymph % 6.8 (*)     Mono % 8.0      Eos % 0.0 (*)     Basophil % 0.2      Lymph # 1.42      Neut # 17.42 (*)     Mono # 1.66 (*)     Eos # 0.00 (*)     Baso # 0.04      Imm Gran # 0.30 (*)     Imm Grans % 1.4 (*)     NRBC% 0.0     LACTIC ACID, PLASMA - Abnormal    Lactic Acid Level 2.5 (*)    NT-PRO NATRIURETIC PEPTIDE - Abnormal    ProBNP 2,670.0 (*)    PROTIME-INR - Normal    PT 10.5      INR 1.0      Narrative:     Protimes are used to monitor anticoagulant agents such as warfarin. PT INR values are based on the current patient normal mean and the TA value for the specific instrument reagent used.  **Routine theraputic target values for the INR are 2.0-3.0**   APTT - Normal    PTT 25.0      TROPONIN I - Normal    Troponin-I 0.016     COVID/FLU A&B PCR - Normal    Influenza A PCR Not Detected      Influenza B PCR Not Detected      SARS-CoV-2 PCR Not Detected      Narrative:     The Xpert Xpress SARS-CoV-2/FLU/RSV plus is a rapid, multiplexed real-time PCR test intended for the simultaneous qualitative detection and differentiation of SARS-CoV-2, Influenza A, Influenza B, and respiratory syncytial virus (RSV) viral RNA in either nasopharyngeal swab or nasal swab specimens.         STREP GROUP A BY PCR - Normal    STREP A PCR ([facility]) Not Detected      Narrative:     The Xpert Xpress Strep A test is a rapid, qualitative in vitro diagnostic test for the detection of Streptococcus pyogenes (Group A ß-hemolytic Streptococcus, Strep A) in throat swab specimens from patients with signs and symptoms of pharyngitis.     URINALYSIS, MICROSCOPIC - Normal    Bacteria, UA Rare      RBC, UA 0-2      WBC, UA 3-5      Squamous Epithelial Cells, UA Occasional     BLOOD CULTURE [facility]   BLOOD CULTURE [facility]   CBC W/ AUTO DIFFERENTIAL    Narrative:     The following orders were created for panel order CBC auto differential.  Procedure                               Abnormality         Status                     ---------                               -----------         ------                     CBC with Differential[[id number]]       Abnormal            Final result                 Please view results for these tests on the individual orders.   LACTIC ACID, PLASMA     EKG Readings: (Independently Interpreted)   EKG shows normal sinus rhythm with heart rate of 97, W[place] interval 192 QRS duration 126  milliseconds.  No ST depressions or ST elevations       Imaging Results              CT Head Without Contrast (Final result)  Result time 06/28/25 21:28:06      Final result by Robby Garza MD (06/28/25 21:28:06)                   Impression:        1.  Stable chronic findings of moderate generalized atrophy.  No acute  intracranial abnormality.    n/a    Category: n/a    The following dose reduction techniques are used for all CT at [facility]:    1.   Automated exposure control.    2.   Adjustment of the mA and/or kV according to patient size.    3.   Use of iterative reconstruction technique.      Electronically signed by: Robby Garza  Date:    06/28/2025  Time:    21:28               Narrative:    EXAMINATION:  CT HEAD WITHOUT CONTRAST    CLINICAL HISTORY:  fall;    TECHNIQUE:  Low dose axial images were obtained through the head.  Coronal and sagittal reformations were also performed. Contrast was not administered.    COMPARISON:  06/10/2022    FINDINGS:  Multiplanar imaging through the head/brain was performed.Moderate generalized atrophy is present.  I see no intraparynchymal masses, hemorhagic lesions, or dominant wedge shaped infarcts. I see no extraxial masses or abnormal fluid collections.                                       X-Ray Chest 1 View (Final result)  Result time 06/28/25 20:43:37      Final result by Codey Erickson MD (06/28/25 20:43:37)                   Impression:      No acute disease is seen      Electronically signed by: Codey Erickson MD  Date:    06/28/2025  Time:    20:43               Narrative:    EXAMINATION:  XR CHEST 1 VIEW    CLINICAL HISTORY:  Sepsis;    TECHNIQUE:  Single frontal view of the chest was performed.    COMPARISON:  06/04/2022    FINDINGS:  No infiltrates are seen.  Heart size is within normal limits.  Costophrenic angles are clear.  There is vascular calcification noted.                                    X-Rays:   Independently Interpreted Readings:   Other Readings:  Chest x-ray shows no acute findings    Medications   vancomycin - pharmacy to dose (has no administration in time range)   piperacillin-tazobactam (ZOSYN) 4.5 g in D5W 100 mL IVPB (MB+) (has no administration in time range)   sodium chloride 0.9% flush 10 mL (has no administration in time  range)   naloxone 0.4 mg/mL injection 0.02 mg (has no administration in time range)   glucose chewable tablet 16 g (has no administration in time range)   glucose chewable tablet 24 g (has no administration in time range)   dextrose 50% injection 12.5 g (has no administration in time range)   dextrose 50% injection 25 g (has no administration in time range)   glucagon (human recombinant) injection 1 mg (has no administration in time range)   enoxaparin injection 40 mg (has no administration in time range)   ondansetron injection 4 mg (has no administration in time range)   acetaminophen tablet 650 mg (has no administration in time range)   acetaminophen tablet 650 mg (has no administration in time range)   0.9% NaCl infusion (has no administration in time range)   sodium phosphate 30 mmol in D5W 250 mL IVPB (has no administration in time range)   magnesium sulfate 2g in water 50mL IVPB (premix) (has no administration in time range)   sodium chloride 0.9% bolus 1,641 mL 1,641 mL ( Intravenous Verify Only 6/28/25 2140)   acetaminophen tablet 650 mg (650 mg Oral Given 6/28/25 2021)   ondansetron injection 4 mg (4 mg Intravenous Given 6/28/25 2024)   piperacillin-tazobactam (ZOSYN) 4.5 g in D5W 100 mL IVPB (MB+) (0 g Intravenous Stopped 6/28/25 2121)   LORazepam tablet 0.5 mg (0.5 mg Oral Given 6/28/25 2105)   vancomycin (VANCOCIN) 1,000 mg in 0.9% NaCl 250 mL IVPB (admixture device) ( Intravenous Restarted 6/28/25 2140)   magnesium oxide tablet 400 mg (400 mg Oral Given 6/28/25 2150)     Medical Decision Making  This is a 84-year-old female patient came into the emergency room with history of having a fall today that is unwitnessed at home after she tripped on the floor.  Apparently patient's family member got into the house after 30 minutes and found her on the floor.  Patient denies having any loss of consciousness, vomiting, seizures.  Patient does take aspirin at home.  Denies any chest pain shortness of breath or  palpitations.  No abdominal pain or back pain.  Patient on arrival has a temperature of 102.4° F.  Denies cough, congestion.  No vomitings or diarrhea.  Patient does have left leg redness, warmth, swelling.  Does have underlying history of Parkinson's disease.    Differential diagnosis: 1. Sepsis 2. Fall 3.  Closed head injury 4. Pneumonia 5. UTI    Patient has been accepted for admission by Dr. Mendez hospitalist.    Amount and/or Complexity of Data Reviewed  Labs: ordered.  Radiology: ordered.    Risk  OTC drugs.  Prescription drug management.  Decision regarding hospitalization.                                      Clinical Impression:  Final diagnoses:  [A41.9] Sepsis, due to unspecified organism, unspecified whether acute organ dysfunction present (Primary)  [L03.116] Left leg cellulitis  [W19.XXXA] Fall, initial encounter  [G20.C] Parkinsonism, unspecified Parkinsonism type          ED Disposition Condition    Admit                       [1]   Social History  Tobacco Use    Smoking status: Never    Smokeless tobacco: Never   Vaping Use    Vaping status: Never Used   Substance Use Topics    Alcohol use: Not Currently    Drug use: Never        Mil Leija DO  06/28/25 [id number]

## 2025-06-29 NOTE — ASSESSMENT & PLAN NOTE
The likely etiology of thrombocytopenia is infection. The patients 3 most recent labs are listed below.  Recent Labs     06/28/25 2020 06/29/25  0557    146     Plan  - Will transfuse if platelet count is <50k (if undergoing surgical procedure or have active bleeding).  -

## 2025-06-29 NOTE — PROGRESS NOTES
[facility]/Surg  [facility] Medicine  PROGRESS NOTE    Patient Name: Jennifer Rob  MRN: [id number]  Patient Class: IP- Inpatient  Admission Date: 6/28/2025  Attending Physician: Riana Leal MD   Primary Care Provider: Zak Esteves MD         Patient information was obtained from patient and ER records.     Subjective:     Principal Problem:Severe sepsis    Chief Complaint:   Chief Complaint   Patient presents with    Fever    Altered Mental Status    Fall     Family reports pt fell today, unwitnessed, wound to right elbow, pt reports she hit her head, also c/o LLE redness, swelling, warmth. Hx: parkinson's.        HPI: 84-year-old F patient with PMH of Parkinson's disease and bipolar disorder presents to the ER with a fall at home after she tripped on the floor. Family found her on the floor. Patient denies having any loss of consciousness, seizure activity, chest pain shortness of breath or abdominal pain. Family report swelling and erythema on the LLE that started in the calf and then extended circumferentially. Patient on arrival has a temperature of 102.4° F. Labs showed WBC of 20.84, phosphorus of 2.3, Mg 1.7, BNP 2670 and lactate 2.5. She was started on IV antibiotics and admitted for further management.     06/29/2025 pt admitted with fecer and sepsis thought to be due to RLE cellulitis, h/ o Parkinson's disease daughter noted limping on right leg x 1-2 days prior to fever.    Review of Systems   Unable to perform ROS: Mental status change     Vitals:    06/29/25 0957   BP:    Pulse:    Resp:    Temp: 99.7 °F (37.6 °C)     Physical Exam  Vitals and nursing note reviewed. Exam conducted with a chaperone present.   Constitutional:       General: She is not in acute distress.     Appearance: Normal appearance. She is normal weight. She is not ill-appearing.   HENT:      Head: Normocephalic and atraumatic.   Eyes:      General: No scleral icterus.     Extraocular Movements:  Extraocular movements intact.   Cardiovascular:      Rate and Rhythm: Normal rate and regular rhythm.      Pulses: Normal pulses.      Heart sounds: Normal heart sounds.   Pulmonary:      Effort: Pulmonary effort is normal.      Breath sounds: Normal breath sounds.   Abdominal:      General: Abdomen is flat. Bowel sounds are normal.      Palpations: Abdomen is soft.   Musculoskeletal:      Right lower leg: No edema.      Left lower leg: No edema.   Skin:     General: Skin is warm and dry.      Findings: Erythema and rash present. No lesion.      Comments: Left LE redness, dry skin areas no floculence or lesion   Neurological:      General: No focal deficit present.      Mental Status: She is alert and oriented to person, place, and time.   Psychiatric:         Mood and Affect: Mood normal.         Behavior: Behavior normal.         Thought Content: Thought content normal.           Assessment/Plan:     Assessment & Plan  Severe sepsis  - 2/2 cellulitis  - lactate 2.5  - started on IV antibiotics and will continue  Parkinson's disease  - home meds  Bipolar disorder, unspecified  - home meds  Cellulitis  - IV vancomycin and zosyn  Hypophosphatemia  - replete  Hypomagnesemia  Patient with noted electrolyte deficiencies with Hypo-Magnesemia. Latest electrolytes have been reviewed and values are   Magnesium Level   Date Value Ref Range Status   06/28/2025 1.70 (L) 1.80 - 2.40 mg/dL Final   . Will continue to monitor electrolytes closely. Will replace the affected electrolytes and repeat labs to be done after interventions completed.      Anemia  Anemia is likely due to Iron deficiency. Most recent hemoglobin and hematocrit are listed below.  Recent Labs     06/28/25  2020 06/29/25  0557   HGB 11.8 11.5*   HCT 35.5* 35.2*     Plan  - Monitor serial CBC: Daily  - Transfuse PRBC if patient becomes hemodynamically unstable, symptomatic or H/H drops below 7/21.  - Patient has not received any PRBC transfusions to date  -  Patient's anemia is currently stable  -    Hyponatremia  Hyponatremia is likely due to Dehydration/hypovolemia. The patient's most recent sodium results are listed below.  Recent Labs     06/28/25 2020 06/29/25  0557   * 139     Plan  - Correct the sodium by 4-6mEq in 24 hours.   - Obtain the following studies: rsg.  - Will treat the hyponatremia with IV fluids as follows: ns  - Monitor sodium Daily.   - Patient hyponatremia is stable  -    Thrombocytopenia  The likely etiology of thrombocytopenia is infection. The patients 3 most recent labs are listed below.  Recent Labs     06/28/25 2020 06/29/25  0557    146     Plan  - Will transfuse if platelet count is <50k (if undergoing surgical procedure or have active bleeding).  -        VTE Risk Mitigation (From admission, onward)           Ordered     enoxaparin injection 40 mg  Daily         06/28/25 2208     IP VTE HIGH RISK PATIENT  Once         06/28/25 2208                    Service was provided using HIPAA compliant web platform using SOC for audio/visual equipment.   Patient Location: Hospital  Provider Location: Home ([place], [place])  Participants on call: bedside RN, patient  Consent was obtained, and the patient was seen with nurse assisting from the bedside.                  Riana Leal MD  Department of Hospital Medicine  [facility]/Surg

## 2025-06-29 NOTE — SUBJECTIVE & OBJECTIVE
Past Medical History:   Diagnosis Date    Bipolar disorder, unspecified     Hypertension        Past Surgical History:   Procedure Laterality Date    APPENDECTOMY      BREAST BIOPSY      CATARACT EXTRACTION      CHOLECYSTECTOMY      HYSTERECTOMY         Review of patient's allergies indicates:   Allergen Reactions    Sulfa (sulfonamide antibiotics) Rash     unknown reaction.   has been years       No current facility-administered medications on file prior to encounter.     Current Outpatient Medications on File Prior to Encounter   Medication Sig    amLODIPine (NORVASC) 2.5 MG tablet Take 2.5 mg by mouth.    aspirin (ECOTRIN) 81 MG EC tablet Take 81 mg by mouth every other day.    carbidopa-levodopa  mg (SINEMET)  mg per tablet TAKE 2 TABLETS BY MOUTH ONCE DAILY IN THE MORNING IN  THE  EARLY  MORNING,  AND  2  TABS  AT  NOON  AND  1  TAB  IN  THE  EVENING    carbidopa-levodopa  mg (SINEMET)  mg per tablet Take 1-2 tablets by mouth 3 (three) times daily. 2 tab 730 2 tab 1 pm and 1 tab at 5    divalproex (DEPAKOTE) 500 MG TbEC Take 500 mg by mouth nightly.    ferrous sulfate 325 (65 FE) MG EC tablet Take 325 mg by mouth. Weekly    LORazepam (ATIVAN) 1 MG tablet Take 0.5-1 mg by mouth 2 (two) times daily.    metoprolol succinate (TOPROL-XL) 25 MG 24 hr tablet Take 25 mg by mouth once daily.    olmesartan (BENICAR) 20 MG tablet Take 20 mg by mouth once daily.    venlafaxine (EFFEXOR-XR) 75 MG 24 hr capsule Take by mouth.    VITAMIN D3 25 mcg (1,000 unit) Chew Take 1 tablet by mouth 2 (two) times daily.    [DISCONTINUED] cholestyramine (QUESTRAN) 4 gram packet Take by mouth. (Patient not taking: Reported on 8/28/2024)    [DISCONTINUED] CYANOCOBALAMIN, VITAMIN B-12, INJ Inject as directed. (Patient not taking: Reported on 4/23/2025)    [DISCONTINUED] DEPAKOTE  mg 24 hr tablet Take 250 mg by mouth every evening.    [DISCONTINUED] magnesium 30 mg Tab Take by mouth once.    [DISCONTINUED]  magnesium oxide (MAG-OX) 400 mg (241.3 mg magnesium) tablet Take 400 mg by mouth once daily.    [DISCONTINUED] vitamin E 1000 UNIT capsule Take 1,000 Units by mouth once daily. (Patient not taking: Reported on 4/23/2025)     Family History       Problem Relation (Age of Onset)    Seizures Father          Tobacco Use    Smoking status: Never    Smokeless tobacco: Never   Vaping Use    Vaping status: Never Used   Substance and Sexual Activity    Alcohol use: Not Currently    Drug use: Never    Sexual activity: Not on file     Review of Systems  Except as documented, all other systems are negative.      Objective:     Vital Signs (Most Recent):  Temp: 98.8 °F (37.1 °C) (06/28/25 2237)  Pulse: 85 (06/28/25 2241)  Resp: 18 (06/28/25 2241)  BP: 137/65 (06/28/25 2230)  SpO2: 95 % (06/28/25 2241) Vital Signs (24h Range):  Temp:  [98.8 °F (37.1 °C)-102.4 °F (39.1 °C)] 98.8 °F (37.1 °C)  Pulse:  [80-97] 85  Resp:  [16-18] 18  SpO2:  [95 %-100 %] 95 %  BP: (126-173)/(53-74) 137/65     Weight: 50.9 kg (112 lb 3.2 oz)  Body mass index is 19.26 kg/m².     Physical Exam     CONSTITUTIONAL: vitals as noted, lethargic, no distress  HEENT: No scleral icterus, oropharynx clear  RESPIRATORY: clear to auscultation  CVS: regular rate and rhythm  GASTROINTESTINAL: soft, non-tender, non-distended  NEURO: grossly normal exam, moves all extremities  HEM/LYMPH: no lymphadenopathy  PSYCH: alert and oriented x 3, normal affect  SKIN: LLE cellulitis        Significant Labs: All pertinent labs within the past 24 hours have been reviewed.  CBC:   Recent Labs   Lab 06/28/25 2020   WBC 20.84*   HGB 11.8   HCT 35.5*        CMP:   Recent Labs   Lab 06/28/25 2020   *   K 4.4   CL 98   CO2 27   *   BUN 25*   CREATININE 1.09   CALCIUM 8.9   PROT 7.3   ALBUMIN 4.2   BILITOT 0.6   ALKPHOS 62   AST 27   ALT 13       Significant Imaging: I have reviewed all pertinent imaging results/findings within the past 24 hours.

## 2025-06-29 NOTE — ASSESSMENT & PLAN NOTE
Anemia is likely due to Iron deficiency. Most recent hemoglobin and hematocrit are listed below.  Recent Labs     06/28/25  2020 06/29/25  0557   HGB 11.8 11.5*   HCT 35.5* 35.2*     Plan  - Monitor serial CBC: Daily  - Transfuse PRBC if patient becomes hemodynamically unstable, symptomatic or H/H drops below 7/21.  - Patient has not received any PRBC transfusions to date  - Patient's anemia is currently stable  -

## 2025-06-29 NOTE — ASSESSMENT & PLAN NOTE
Patient with noted electrolyte deficiencies with Hypo-Magnesemia. Latest electrolytes have been reviewed and values are   Magnesium Level   Date Value Ref Range Status   06/28/2025 1.70 (L) 1.80 - 2.40 mg/dL Final   . Will continue to monitor electrolytes closely. Will replace the affected electrolytes and repeat labs to be done after interventions completed.

## 2025-06-29 NOTE — ASSESSMENT & PLAN NOTE
Hyponatremia is likely due to Dehydration/hypovolemia. The patient's most recent sodium results are listed below.  Recent Labs     06/28/25  2020 06/29/25  0557   * 139     Plan  - Correct the sodium by 4-6mEq in 24 hours.   - Obtain the following studies: rsg.  - Will treat the hyponatremia with IV fluids as follows: ns  - Monitor sodium Daily.   - Patient hyponatremia is stable  -

## 2025-06-29 NOTE — HPI
84-year-old F patient with PMH of Parkinson's disease and bipolar disorder presents to the ER with a fall at home after she tripped on the floor. Family found her on the floor. Patient denies having any loss of consciousness, seizure activity, chest pain shortness of breath or abdominal pain. Family report swelling and erythema on the LLE that started in the calf and then extended circumferentially. Patient on arrival has a temperature of 102.4° F. Labs showed WBC of 20.84, phosphorus of 2.3, Mg 1.7, BNP 2670 and lactate 2.5. She was started on IV antibiotics and admitted for further management.

## 2025-06-29 NOTE — H&P
[facility]/Surg  [facility] Medicine  History & Physical    Patient Name: Jennifer Rob  MRN: [id number]  Patient Class: IP- Inpatient  Admission Date: 6/28/2025  Attending Physician: Riana Leal MD   Primary Care Provider: Zak Esteves MD         Patient information was obtained from patient and ER records.     Subjective:     Principal Problem:Severe sepsis    Chief Complaint:   Chief Complaint   Patient presents with    Fever    Altered Mental Status    Fall     Family reports pt fell today, unwitnessed, wound to right elbow, pt reports she hit her head, also c/o LLE redness, swelling, warmth. Hx: parkinson's.        HPI: 84-year-old F patient with PMH of Parkinson's disease and bipolar disorder presents to the ER with a fall at home after she tripped on the floor. Family found her on the floor. Patient denies having any loss of consciousness, seizure activity, chest pain shortness of breath or abdominal pain. Family report swelling and erythema on the LLE that started in the calf and then extended circumferentially. Patient on arrival has a temperature of 102.4° F. Labs showed WBC of 20.84, phosphorus of 2.3, Mg 1.7, BNP 2670 and lactate 2.5. She was started on IV antibiotics and admitted for further management.     Past Medical History:   Diagnosis Date    Bipolar disorder, unspecified     Hypertension        Past Surgical History:   Procedure Laterality Date    APPENDECTOMY      BREAST BIOPSY      CATARACT EXTRACTION      CHOLECYSTECTOMY      HYSTERECTOMY         Review of patient's allergies indicates:   Allergen Reactions    Sulfa (sulfonamide antibiotics) Rash     unknown reaction.   has been years       No current facility-administered medications on file prior to encounter.     Current Outpatient Medications on File Prior to Encounter   Medication Sig    amLODIPine (NORVASC) 2.5 MG tablet Take 2.5 mg by mouth.    aspirin (ECOTRIN) 81 MG EC tablet Take 81 mg by mouth every  other day.    carbidopa-levodopa  mg (SINEMET)  mg per tablet TAKE 2 TABLETS BY MOUTH ONCE DAILY IN THE MORNING IN  THE  EARLY  MORNING,  AND  2  TABS  AT  NOON  AND  1  TAB  IN  THE  EVENING    carbidopa-levodopa  mg (SINEMET)  mg per tablet Take 1-2 tablets by mouth 3 (three) times daily. 2 tab 730 2 tab 1 pm and 1 tab at 5    divalproex (DEPAKOTE) 500 MG TbEC Take 500 mg by mouth nightly.    ferrous sulfate 325 (65 FE) MG EC tablet Take 325 mg by mouth. Weekly    LORazepam (ATIVAN) 1 MG tablet Take 0.5-1 mg by mouth 2 (two) times daily.    metoprolol succinate (TOPROL-XL) 25 MG 24 hr tablet Take 25 mg by mouth once daily.    olmesartan (BENICAR) 20 MG tablet Take 20 mg by mouth once daily.    venlafaxine (EFFEXOR-XR) 75 MG 24 hr capsule Take by mouth.    VITAMIN D3 25 mcg (1,000 unit) Chew Take 1 tablet by mouth 2 (two) times daily.    [DISCONTINUED] cholestyramine (QUESTRAN) 4 gram packet Take by mouth. (Patient not taking: Reported on 8/28/2024)    [DISCONTINUED] CYANOCOBALAMIN, VITAMIN B-12, INJ Inject as directed. (Patient not taking: Reported on 4/23/2025)    [DISCONTINUED] DEPAKOTE  mg 24 hr tablet Take 250 mg by mouth every evening.    [DISCONTINUED] magnesium 30 mg Tab Take by mouth once.    [DISCONTINUED] magnesium oxide (MAG-OX) 400 mg (241.3 mg magnesium) tablet Take 400 mg by mouth once daily.    [DISCONTINUED] vitamin E 1000 UNIT capsule Take 1,000 Units by mouth once daily. (Patient not taking: Reported on 4/23/2025)     Family History       Problem Relation (Age of Onset)    Seizures Father          Tobacco Use    Smoking status: Never    Smokeless tobacco: Never   Vaping Use    Vaping status: Never Used   Substance and Sexual Activity    Alcohol use: Not Currently    Drug use: Never    Sexual activity: Not on file     Review of Systems  Except as documented, all other systems are negative.      Objective:     Vital Signs (Most Recent):  Temp: 98.8 °F (37.1 °C)  (06/28/25 2237)  Pulse: 85 (06/28/25 2241)  Resp: 18 (06/28/25 2241)  BP: 137/65 (06/28/25 2230)  SpO2: 95 % (06/28/25 2241) Vital Signs (24h Range):  Temp:  [98.8 °F (37.1 °C)-102.4 °F (39.1 °C)] 98.8 °F (37.1 °C)  Pulse:  [80-97] 85  Resp:  [16-18] 18  SpO2:  [95 %-100 %] 95 %  BP: (126-173)/(53-74) 137/65     Weight: 50.9 kg (112 lb 3.2 oz)  Body mass index is 19.26 kg/m².     Physical Exam     CONSTITUTIONAL: vitals as noted, lethargic, no distress  HEENT: No scleral icterus, oropharynx clear  RESPIRATORY: clear to auscultation  CVS: regular rate and rhythm  GASTROINTESTINAL: soft, non-tender, non-distended  NEURO: grossly normal exam, moves all extremities  HEM/LYMPH: no lymphadenopathy  PSYCH: alert and oriented x 3, normal affect  SKIN: LLE cellulitis        Significant Labs: All pertinent labs within the past 24 hours have been reviewed.  CBC:   Recent Labs   Lab 06/28/25 2020   WBC 20.84*   HGB 11.8   HCT 35.5*        CMP:   Recent Labs   Lab 06/28/25 2020   *   K 4.4   CL 98   CO2 27   *   BUN 25*   CREATININE 1.09   CALCIUM 8.9   PROT 7.3   ALBUMIN 4.2   BILITOT 0.6   ALKPHOS 62   AST 27   ALT 13       Significant Imaging: I have reviewed all pertinent imaging results/findings within the past 24 hours.  Assessment/Plan:     Assessment & Plan  Severe sepsis  - 2/2 cellulitis  - lactate 2.5  - started on IV antibiotics and will continue  Cellulitis  - IV vancomycin and zosyn  Hypophosphatemia  - replete  Hypomagnesemia  - replete   Parkinson's disease  - home meds  Bipolar disorder, unspecified  - home meds  VTE Risk Mitigation (From admission, onward)           Ordered     enoxaparin injection 40 mg  Daily         06/28/25 2208     IP VTE HIGH RISK PATIENT  Once         06/28/25 2208     Place sequential compression device  Until discontinued         06/28/25 2208                    Service was provided using HIPAA compliant web platform using SOC for audio/visual equipment.   Patient  Location: Hospital  Provider Location: Home ([place], [place])  Participants on call: bedside RN, patient  Consent was obtained, and the patient was seen with nurse assisting from the bedside.                  Rahat Mendez MD  Department of Hospital Medicine  [facility]/Surg

## 2025-06-29 NOTE — PLAN OF CARE
Problem: Adult Inpatient Plan of Care  Goal: Plan of Care Review  Outcome: Progressing  Goal: Patient-Specific Goal (Individualized)  Outcome: Progressing  Goal: Absence of Hospital-Acquired Illness or Injury  Outcome: Progressing  Goal: Optimal Comfort and Wellbeing  Outcome: Progressing  Goal: Readiness for Transition of Care  Outcome: Progressing     Problem: Wound  Goal: Optimal Coping  Outcome: Progressing  Goal: Optimal Functional Ability  Outcome: Progressing  Goal: Absence of Infection Signs and Symptoms  Outcome: Progressing  Goal: Improved Oral Intake  Outcome: Progressing  Goal: Optimal Pain Control and Function  Outcome: Progressing  Goal: Skin Health and Integrity  Outcome: Progressing  Goal: Optimal Wound Healing  Outcome: Progressing     Problem: Sepsis/Septic Shock  Goal: Optimal Coping  Outcome: Progressing  Goal: Absence of Bleeding  Outcome: Progressing  Goal: Blood Glucose Level Within Targeted Range  Outcome: Progressing  Goal: Absence of Infection Signs and Symptoms  Outcome: Progressing  Goal: Optimal Nutrition Intake  Outcome: Progressing     Problem: Skin Injury Risk Increased  Goal: Skin Health and Integrity  Outcome: Progressing     Problem: Fall Injury Risk  Goal: Absence of Fall and Fall-Related Injury  Outcome: Progressing     Problem: Infection  Goal: Absence of Infection Signs and Symptoms  Outcome: Progressing    Admitted tonight after fall at home,fever, & ams;afebrile so far tonight but was medicated for fever in er;some electrolytes abnormal so replacing those;uneventful time since admission so far;meds administered as md ordered

## 2025-06-29 NOTE — PROGRESS NOTES
"Pharmacokinetic Initial Assessment: IV Vancomycin    Assessment/Plan:    Initiate intravenous vancomycin with loading dose of 1000 mg once followed by a maintenance dose of vancomycin 750mg IV every 24 hours  Desired empiric serum trough concentration is 15 to 20 mcg/mL  Draw vancomycin trough level 60 min prior to third dose on 6/30 at approximately 2100  Pharmacy will continue to follow and monitor vancomycin.      Please contact pharmacy with any questions regarding this assessment.     Thank you for the consult,   Riana Hernandezer       Patient brief summary:  Jennifer Rob is a 84 y.o. female initiated on antimicrobial therapy with IV Vancomycin for treatment of suspected sepsis    Drug Allergies:   Review of patient's allergies indicates:   Allergen Reactions    Sulfa (sulfonamide antibiotics) Rash     unknown reaction.   has been years       Actual Body Weight:   50.9kg    Renal Function:   Estimated Creatinine Clearance: 30.3 mL/min (based on SCr of 1.09 mg/dL).,     Dialysis Method (if applicable):  N/A    CBC (last 72 hours):  Recent Labs   Lab Result Units 06/28/25 2020   WBC x10(3)/mcL 20.84*   Hgb g/dL 11.8   Hct % 35.5*   Platelet x10(3)/mcL 176   Mono % % 8.0   Eos % % 0.0*   Basophil % % 0.2       Metabolic Panel (last 72 hours):  Recent Labs   Lab Result Units 06/28/25 2020 06/28/25  2046   Sodium mmol/L 131*  --    Potassium mmol/L 4.4  --    Chloride mmol/L 98  --    CO2 mmol/L 27  --    Glucose mg/dL 158*  --    Glucose, UA   --  Negative   Blood Urea Nitrogen mg/dL 25*  --    Creatinine mg/dL 1.09  --    Albumin g/dL 4.2  --    Bilirubin Total mg/dL 0.6  --    ALP unit/L 62  --    AST unit/L 27  --    ALT unit/L 13  --    Magnesium Level mg/dL 1.70*  --    Phosphorus Level mg/dL 2.3*  --        Drug levels (last 3 results):  No results for input(s): "VANCOMYCINRA", "VANCORANDOM", "VANCOMYCINPE", "VANCOPEAK", "VANCOMYCINTR", "VANCOTROUGH" in the last 72 hours.    Microbiologic " Results:  Microbiology Results (last 7 days)       Procedure Component Value Units Date/Time    Blood Culture x two cultures. Draw prior to antibiotics [[id number]] Collected: 06/28/25 2033    Order Status: Resulted Specimen: Blood Updated: 06/28/25 2047    Blood Culture x two cultures. Draw prior to antibiotics [[id number]] Collected: 06/28/25 2020    Order Status: Resulted Specimen: Blood Updated: 06/28/25 2047

## 2025-06-29 NOTE — ASSESSMENT & PLAN NOTE
Hyponatremia is likely due to Dehydration/hypovolemia. The patient's most recent sodium results are listed below.  Recent Labs     06/28/25 2020 06/29/25  0557   * 139     Plan  - Correct the sodium by 4-6mEq in 24 hours.   - Obtain the following studies: TSH, T4.  - Will treat the hyponatremia with IV fluids as follows: ns  - Monitor sodium Daily.   - Patient hyponatremia is stable  -

## 2025-06-29 NOTE — PLAN OF CARE
Problem: Adult Inpatient Plan of Care  Goal: Plan of Care Review  Outcome: Progressing  Goal: Patient-Specific Goal (Individualized)  Outcome: Progressing  Goal: Absence of Hospital-Acquired Illness or Injury  Outcome: Progressing  Goal: Optimal Comfort and Wellbeing  Outcome: Progressing  Goal: Readiness for Transition of Care  Outcome: Progressing     Problem: Wound  Goal: Optimal Coping  Outcome: Progressing  Goal: Optimal Functional Ability  Outcome: Progressing  Goal: Absence of Infection Signs and Symptoms  Outcome: Progressing  Goal: Improved Oral Intake  Outcome: Progressing  Goal: Optimal Pain Control and Function  Outcome: Progressing  Goal: Skin Health and Integrity  Outcome: Progressing  Goal: Optimal Wound Healing  Outcome: Progressing     Problem: Sepsis/Septic Shock  Goal: Optimal Coping  Outcome: Progressing  Goal: Absence of Bleeding  Outcome: Progressing  Goal: Blood Glucose Level Within Targeted Range  Outcome: Progressing  Goal: Absence of Infection Signs and Symptoms  Outcome: Progressing  Goal: Optimal Nutrition Intake  Outcome: Progressing     Problem: Skin Injury Risk Increased  Goal: Skin Health and Integrity  Outcome: Progressing     Problem: Fall Injury Risk  Goal: Absence of Fall and Fall-Related Injury  Outcome: Progressing     Problem: Infection  Goal: Absence of Infection Signs and Symptoms  Outcome: Progressing

## 2025-06-30 LAB
ALBUMIN SERPL-MCNC: 2.8 G/DL (ref 3.4–5)
ALBUMIN/GLOB SERPL: 1.1 RATIO
ALP SERPL-CCNC: 53 UNIT/L (ref 50–144)
ALT SERPL-CCNC: 13 UNIT/L (ref 1–45)
ANION GAP SERPL CALC-SCNC: 1 MEQ/L (ref 2–13)
AST SERPL-CCNC: 35 UNIT/L (ref 14–36)
BASOPHILS # BLD AUTO: 0.04 X10(3)/MCL (ref 0.01–0.08)
BASOPHILS NFR BLD AUTO: 0.4 % (ref 0.1–1.2)
BILIRUB SERPL-MCNC: 0.3 MG/DL (ref 0–1)
BUN SERPL-MCNC: 15 MG/DL (ref 7–20)
CALCIUM SERPL-MCNC: 8.2 MG/DL (ref 8.4–10.2)
CHLORIDE SERPL-SCNC: 114 MMOL/L (ref 98–110)
CO2 SERPL-SCNC: 28 MMOL/L (ref 21–32)
CREAT SERPL-MCNC: 1.08 MG/DL (ref 0.66–1.25)
CREAT/UREA NIT SERPL: 14 (ref 12–20)
EOSINOPHIL # BLD AUTO: 0.11 X10(3)/MCL (ref 0.04–0.36)
EOSINOPHIL NFR BLD AUTO: 1.2 % (ref 0.7–7)
ERYTHROCYTE [DISTWIDTH] IN BLOOD BY AUTOMATED COUNT: 12.9 % (ref 11–14.5)
FERRITIN SERPL-MCNC: 138 NG/ML (ref 6.24–264)
FOLATE SERPL-MCNC: 9.7 NG/ML (ref 2.8–20)
GFR SERPLBLD CREATININE-BSD FMLA CKD-EPI: 50 ML/MIN/1.73/M2
GLOBULIN SER-MCNC: 2.6 GM/DL (ref 2–3.9)
GLUCOSE SERPL-MCNC: 83 MG/DL (ref 70–115)
HCT VFR BLD AUTO: 34.6 % (ref 36–48)
HGB BLD-MCNC: 11 G/DL (ref 11.8–16)
IMM GRANULOCYTES # BLD AUTO: 0.02 X10(3)/MCL (ref 0–0.03)
IMM GRANULOCYTES NFR BLD AUTO: 0.2 % (ref 0–0.5)
IRON SATN MFR SERPL: 6 % (ref 20–50)
IRON SERPL-MCNC: 10 UG/DL (ref 50–170)
LYMPHOCYTES # BLD AUTO: 1.56 X10(3)/MCL (ref 1.16–3.74)
LYMPHOCYTES NFR BLD AUTO: 17.4 % (ref 20–55)
MAGNESIUM SERPL-MCNC: 2.1 MG/DL (ref 1.8–2.4)
MCH RBC QN AUTO: 31.8 PG (ref 27–34)
MCHC RBC AUTO-ENTMCNC: 31.8 G/DL (ref 31–37)
MCV RBC AUTO: 100 FL (ref 79–99)
MONOCYTES # BLD AUTO: 0.74 X10(3)/MCL (ref 0.24–0.36)
MONOCYTES NFR BLD AUTO: 8.3 % (ref 4.7–12.5)
NEUTROPHILS # BLD AUTO: 6.47 X10(3)/MCL (ref 1.56–6.13)
NEUTROPHILS NFR BLD AUTO: 72.5 % (ref 37–73)
PLATELET # BLD AUTO: 144 X10(3)/MCL (ref 140–371)
PMV BLD AUTO: 10.9 FL (ref 9.4–12.4)
POTASSIUM SERPL-SCNC: 4.2 MMOL/L (ref 3.5–5.1)
PROT SERPL-MCNC: 5.4 GM/DL (ref 6.3–8.2)
RBC # BLD AUTO: 3.46 X10(6)/MCL (ref 4–5.1)
SODIUM SERPL-SCNC: 143 MMOL/L (ref 136–145)
TIBC SERPL-MCNC: 161 UG/DL (ref 70–310)
TIBC SERPL-MCNC: 171 UG/DL (ref 250–450)
TRANSFERRIN SERPL-MCNC: 168 MG/DL
TSH SERPL-ACNC: 1.94 UIU/ML (ref 0.36–3.74)
VANCOMYCIN TROUGH SERPL-MCNC: 8.2 UG/ML (ref 10–20)
VIT B12 SERPL-MCNC: 607 PG/ML (ref 211–946)
WBC # BLD AUTO: 8.94 X10(3)/MCL (ref 4–11.5)

## 2025-06-30 PROCEDURE — 97162 PT EVAL MOD COMPLEX 30 MIN: CPT

## 2025-06-30 PROCEDURE — 83735 ASSAY OF MAGNESIUM: CPT | Performed by: FAMILY MEDICINE

## 2025-06-30 PROCEDURE — 83550 IRON BINDING TEST: CPT | Performed by: FAMILY MEDICINE

## 2025-06-30 PROCEDURE — 82607 VITAMIN B-12: CPT | Performed by: FAMILY MEDICINE

## 2025-06-30 PROCEDURE — 25000003 PHARM REV CODE 250: Performed by: FAMILY MEDICINE

## 2025-06-30 PROCEDURE — 80053 COMPREHEN METABOLIC PANEL: CPT | Performed by: INTERNAL MEDICINE

## 2025-06-30 PROCEDURE — 36415 COLL VENOUS BLD VENIPUNCTURE: CPT | Performed by: FAMILY MEDICINE

## 2025-06-30 PROCEDURE — 63600175 PHARM REV CODE 636 W HCPCS: Performed by: FAMILY MEDICINE

## 2025-06-30 PROCEDURE — 84443 ASSAY THYROID STIM HORMONE: CPT | Performed by: FAMILY MEDICINE

## 2025-06-30 PROCEDURE — 25000003 PHARM REV CODE 250: Performed by: INTERNAL MEDICINE

## 2025-06-30 PROCEDURE — 82746 ASSAY OF FOLIC ACID SERUM: CPT | Performed by: FAMILY MEDICINE

## 2025-06-30 PROCEDURE — 94761 N-INVAS EAR/PLS OXIMETRY MLT: CPT

## 2025-06-30 PROCEDURE — 21400001 HC TELEMETRY ROOM

## 2025-06-30 PROCEDURE — 80202 ASSAY OF VANCOMYCIN: CPT | Performed by: FAMILY MEDICINE

## 2025-06-30 PROCEDURE — 82728 ASSAY OF FERRITIN: CPT | Performed by: FAMILY MEDICINE

## 2025-06-30 PROCEDURE — 63600175 PHARM REV CODE 636 W HCPCS: Performed by: INTERNAL MEDICINE

## 2025-06-30 PROCEDURE — 85025 COMPLETE CBC W/AUTO DIFF WBC: CPT | Performed by: INTERNAL MEDICINE

## 2025-06-30 RX ORDER — DEXTROSE MONOHYDRATE 50 MG/ML
INJECTION, SOLUTION INTRAVENOUS
Status: DISCONTINUED | OUTPATIENT
Start: 2025-06-30 | End: 2025-07-05 | Stop reason: HOSPADM

## 2025-06-30 RX ORDER — SODIUM CHLORIDE 9 MG/ML
INJECTION, SOLUTION INTRAVENOUS
Status: DISCONTINUED | OUTPATIENT
Start: 2025-06-30 | End: 2025-07-03

## 2025-06-30 RX ORDER — CLONIDINE HYDROCHLORIDE 0.1 MG/1
0.2 TABLET ORAL EVERY 6 HOURS PRN
Status: DISCONTINUED | OUTPATIENT
Start: 2025-06-30 | End: 2025-07-05 | Stop reason: HOSPADM

## 2025-06-30 RX ORDER — BISACODYL 5 MG
10 TABLET, DELAYED RELEASE (ENTERIC COATED) ORAL 2 TIMES DAILY
Status: DISCONTINUED | OUTPATIENT
Start: 2025-06-30 | End: 2025-07-05 | Stop reason: HOSPADM

## 2025-06-30 RX ORDER — HYDRALAZINE HYDROCHLORIDE 20 MG/ML
10 INJECTION INTRAMUSCULAR; INTRAVENOUS EVERY 4 HOURS PRN
Status: DISCONTINUED | OUTPATIENT
Start: 2025-06-30 | End: 2025-07-05 | Stop reason: HOSPADM

## 2025-06-30 RX ADMIN — CARBIDOPA AND LEVODOPA 1 TABLET: 25; 100 TABLET ORAL at 08:06

## 2025-06-30 RX ADMIN — CARBIDOPA AND LEVODOPA 1 TABLET: 10; 100 TABLET ORAL at 09:06

## 2025-06-30 RX ADMIN — SODIUM CHLORIDE: 9 INJECTION, SOLUTION INTRAVENOUS at 10:06

## 2025-06-30 RX ADMIN — CARBIDOPA AND LEVODOPA 1 TABLET: 25; 100 TABLET ORAL at 12:06

## 2025-06-30 RX ADMIN — DIVALPROEX SODIUM 500 MG: 250 TABLET, DELAYED RELEASE ORAL at 09:06

## 2025-06-30 RX ADMIN — FERROUS SULFATE TAB 325 MG (65 MG ELEMENTAL FE) 1 EACH: 325 (65 FE) TAB at 08:06

## 2025-06-30 RX ADMIN — CARBIDOPA AND LEVODOPA 1 TABLET: 10; 100 TABLET ORAL at 04:06

## 2025-06-30 RX ADMIN — PIPERACILLIN AND TAZOBACTAM 4.5 G: 4; .5 INJECTION, POWDER, FOR SOLUTION INTRAVENOUS; PARENTERAL at 12:06

## 2025-06-30 RX ADMIN — METOPROLOL SUCCINATE 25 MG: 25 TABLET, EXTENDED RELEASE ORAL at 08:06

## 2025-06-30 RX ADMIN — BISACODYL 10 MG: 5 TABLET, COATED ORAL at 10:06

## 2025-06-30 RX ADMIN — ACETAMINOPHEN 650 MG: 325 TABLET, FILM COATED ORAL at 09:06

## 2025-06-30 RX ADMIN — VANCOMYCIN HYDROCHLORIDE 750 MG: 750 INJECTION, POWDER, LYOPHILIZED, FOR SOLUTION INTRAVENOUS at 10:06

## 2025-06-30 RX ADMIN — CARBIDOPA AND LEVODOPA 1 TABLET: 10; 100 TABLET ORAL at 08:06

## 2025-06-30 RX ADMIN — CARBIDOPA AND LEVODOPA 1 TABLET: 10; 100 TABLET ORAL at 12:06

## 2025-06-30 RX ADMIN — LORAZEPAM 0.5 MG: 0.5 TABLET ORAL at 04:06

## 2025-06-30 RX ADMIN — ENOXAPARIN SODIUM 40 MG: 40 INJECTION SUBCUTANEOUS at 04:06

## 2025-06-30 RX ADMIN — ASPIRIN 81 MG: 81 TABLET ORAL at 08:06

## 2025-06-30 RX ADMIN — AMLODIPINE BESYLATE 2.5 MG: 2.5 TABLET ORAL at 09:06

## 2025-06-30 RX ADMIN — LOSARTAN POTASSIUM 50 MG: 50 TABLET, FILM COATED ORAL at 08:06

## 2025-06-30 RX ADMIN — CARBIDOPA AND LEVODOPA 1 TABLET: 25; 100 TABLET ORAL at 04:06

## 2025-06-30 RX ADMIN — PIPERACILLIN AND TAZOBACTAM 4.5 G: 4; .5 INJECTION, POWDER, FOR SOLUTION INTRAVENOUS; PARENTERAL at 09:06

## 2025-06-30 RX ADMIN — SODIUM CHLORIDE: 9 INJECTION, SOLUTION INTRAVENOUS at 06:06

## 2025-06-30 RX ADMIN — VENLAFAXINE HYDROCHLORIDE 75 MG: 75 CAPSULE, EXTENDED RELEASE ORAL at 08:06

## 2025-06-30 RX ADMIN — ACETAMINOPHEN 650 MG: 325 TABLET, FILM COATED ORAL at 07:06

## 2025-06-30 RX ADMIN — DEXTROSE MONOHYDRATE: 5 INJECTION, SOLUTION INTRAVENOUS at 12:06

## 2025-06-30 RX ADMIN — PIPERACILLIN AND TAZOBACTAM 4.5 G: 4; .5 INJECTION, POWDER, FOR SOLUTION INTRAVENOUS; PARENTERAL at 04:06

## 2025-06-30 NOTE — PT/OT/SLP EVAL
Physical Therapy Evaluation    Patient Name:  Jennifer Rob   MRN:  [id number]    Recommendations:     Discharge Recommendations: Low Intensity Therapy   Discharge Equipment Recommendations: none   Barriers to discharge: current medical status    Assessment:     Jennifer Rob is a 84 y.o. female admitted with a medical diagnosis of Severe sepsis.  She presents with the following impairments/functional limitations: weakness, impaired endurance, impaired self care skills, impaired functional mobility, gait instability, impaired balance, impaired cognition, decreased lower extremity function, decreased safety awareness     Patient found with HOB elevated. Daughter present. Daughter reports recent fall. Patient able to tolerate supine to sit with min A, then stood from EOB with min A with HHA, with noted dizziness when standing, patient requested to sit back down. Patient then agreeable to sit up in chair for lunch. Patient tolerated another stand with RW this time and able to transfer to chair with min/mod A with RW with max verbal cues for sequencing and safety with short steps noted due to Parkinsons. Patient sat in chair with chair alarm and daughter present. Nurse notified. .    Rehab Prognosis: Good; patient would benefit from acute skilled PT services to address these deficits and reach maximum level of function.    Recent Surgery: * No surgery found *      Plan:     During this hospitalization, patient to be seen 5 x/week (5-6x weekly/1-2x daily) to address the identified rehab impairments via gait training, therapeutic exercises, therapeutic activities and progress toward the following goals:    Plan of Care Expires:  07/30/25    Subjective     Chief Complaint: dizziness  Patient/Family Comments/goals: to go home  Pain/Comfort:       Patients cultural, spiritual, Scientologist conflicts given the current situation:      Living Environment:  Lives home with family and sitters  Prior to admission, patients  level of function was used walker for mobility.  Equipment used at home: wheelchair, rollator, walker, standard.  DME owned (not currently used): none.  Upon discharge, patient will have assistance from family.    Objective:     Communicated with nursing  prior to session.  Patient found HOB elevated with peripheral IV, PureWick  upon PT entry to room.    General Precautions: Standard, fall  Orthopedic Precautions:N/A   Braces: N/A  Respiratory Status: Room air    Exams:  RLE Strength: grossly 3+/5  LLE Strength: grossly 3+/5    Functional Mobility:  Bed Mobility:     Supine to Sit: minimum assistance  Transfers:     Sit to Stand:  minimum assistance with rolling walker  Bed to Chair: minimum assistance with  rolling walker  using  Step Transfer      AM-PAC 6 CLICK MOBILITY  Total Score:        Treatment & Education:  See above    Patient left up in chair with all lines intact, call button in reach, chair alarm on, nurse notified, and family present.    GOALS:   Multidisciplinary Problems       Physical Therapy Goals          Problem: Physical Therapy    Goal Priority Disciplines Outcome Interventions   Physical Therapy Goal     PT, PT/OT Progressing    Description: Goals to be met by: discharge     Patient will increase functional independence with mobility by performin. Supine to sit with Stand-by Assistance  2. Sit to stand transfer with Stand-by Assistance  3. Gait  x 50 feet with Stand-by Assistance using Rolling Walker.                          DME Justifications:  No DME recommended requiring DME justifications    History:     Past Medical History:   Diagnosis Date    Bipolar disorder, unspecified     Hypertension        Past Surgical History:   Procedure Laterality Date    APPENDECTOMY      BREAST BIOPSY      CATARACT EXTRACTION      CHOLECYSTECTOMY      HYSTERECTOMY         Time Tracking:     PT Received On: 25  PT Start Time: 1115     PT Stop Time: 1140  PT Total Time (min): 25 min      Billable Minutes: Evaluation 25 06/30/2025

## 2025-06-30 NOTE — PROGRESS NOTES
Inpatient Nutrition Assessment    Admit Date: 6/28/2025   Total duration of encounter: 2 days   Patient Age: 84 y.o.    Nutrition Recommendation/Prescription     Continue Regular diet as medically appropriate.     Add Boost Very High Calorie- Vanilla 1x/day providing (530 kcal, 22 gm pro) to aid in nutritional intake.      Continue to encourage PO intake and honor patient preferences.    Dietitian will monitor Electrolytes, Energy Intake, Food and Beverage Intake, Weight, and Weight Change and adjust MNT as needed.     Monitoring & Evaluation     Communication of Recommendations: reviewed with nurse and reviewed with family    Reason Seen: continuous nutrition monitoring and malnutrition screening tool (MST)    Nutrition Risk/Follow-Up: low (follow-up in 5-7 days)    Next Date to be Seen by RD: 07/07/25  Please consult if re-assessment needed sooner.      Nutrition Assessment     Malnutrition Assessment/Nutrition-Focused Physical Exam       Malnutrition Level: other (see comments) (Does not meet criteria) (06/30/25 1651)  Energy Intake (Malnutrition): other (see comments) (Does not meet criteria) (06/30/25 1651)  Weight Loss (Malnutrition): other (see comments) (Does not meet criteria) (06/30/25 1651)                                         Fluid Accumulation (Malnutrition): other (see comments) (Not present) (06/30/25 1651)        A minimum of two characteristics is recommended for diagnosis of either severe or non-severe malnutrition.    Chart Review    Malnutrition Screening Tool Results   Have you recently lost weight without trying?: Unsure  Have you been eating poorly because of a decreased appetite?: Yes   MST Score: 3     Home Nutrition Screen Results   Home Tube Feeding: No   Home Parenteral Nutrition: No     Diagnosis:  Severe sepsis  Parkinson's disorder  Bipolar disorder  Cellulitis  Hypophosphatemia  Hypomagnesemia  Anemia  Hyponatremia  Thrombocytopenia    Past Medical History:   Diagnosis Date     Bipolar disorder, unspecified     Hypertension      Past Surgical History:   Procedure Laterality Date    APPENDECTOMY      BREAST BIOPSY      CATARACT EXTRACTION      CHOLECYSTECTOMY      HYSTERECTOMY         Scheduled Medications:  amLODIPine, 2.5 mg, Nightly  aspirin, 81 mg, Every other day  bisacodyL, 10 mg, BID  carbidopa-levodopa  mg, 1 tablet, QID  carbidopa-levodopa  mg, 1 tablet, TID  cholecalciferol (vitamin D3), 50,000 Units, Weekly  divalproex, 500 mg, Nightly  enoxparin, 40 mg, Daily  ferrous sulfate, 1 tablet, Daily  losartan, 50 mg, Daily  metoprolol succinate, 25 mg, Daily  piperacillin-tazobactam (Zosyn) IV (PEDS and ADULTS) (extended infusion is not appropriate), 4.5 g, Q8H  vancomycin (VANCOCIN) IV (PEDS and ADULTS), 750 mg, Q24H  venlafaxine, 75 mg, Daily    Continuous Infusions:  0.9% NaCl, Last Rate: 100 mL/hr at 06/30/25 1637    PRN Medications:   Current Facility-Administered Medications:     acetaminophen, 650 mg, Oral, Q4H PRN    acetaminophen, 650 mg, Oral, Q8H PRN    cloNIDine, 0.2 mg, Oral, Q6H PRN    D5W, , Intravenous, PRN    dextrose 50%, 12.5 g, Intravenous, PRN    dextrose 50%, 25 g, Intravenous, PRN    glucagon (human recombinant), 1 mg, Intramuscular, PRN    glucose, 16 g, Oral, PRN    glucose, 24 g, Oral, PRN    hydrALAZINE, 10 mg, Intravenous, Q4H PRN    LORazepam, 0.5 mg, Oral, BID PRN    naloxone, 0.02 mg, Intravenous, PRN    ondansetron, 4 mg, Intravenous, Q8H PRN    sodium chloride 0.9%, 10 mL, Intravenous, Q12H PRN    Pharmacy to dose Vancomycin consult, , , Once **AND** vancomycin - pharmacy to dose, , Intravenous, pharmacy to manage frequency    Calorie Containing IV Medications: no significant kcals from medications at this time    Nutrition-Related Labs:   Recent Labs   Lab 06/28/25  2020 06/29/25  0557 06/30/25  0447   * 139 143   K 4.4 4.2 4.2   CALCIUM 8.9 8.2* 8.2*   PHOS 2.3*  --   --    MG 1.70*  --  2.10   CO2 27 28 28   BUN 25* 17 15  "  CREATININE 1.09 1.12 1.08   EGFRNORACEVR 50 48 50   BILITOT 0.6 0.4 0.3   ALKPHOS 62 56 53   ALT 13 12 13   AST 27 32 35   ALBUMIN 4.2 3.2* 2.8*   WBC 20.84* 16.05* 8.94   HGB 11.8 11.5* 11.0*   HCT 35.5* 35.2* 34.6*     CrCl:    Lab Value: 30.9    Date:     Nutrition Orders:  Diet Adult Regular      Appetite/Oral Intake: good/50-75% of meals  Factors Affecting Nutritional Intake: chewing difficulty  Social Needs Impacting Access to Food: none identified  Food Insecurity: No Food Insecurity (2025)    Hunger Vital Sign     Worried About Running Out of Food in the Last Year: Never true     Ran Out of Food in the Last Year: Never true     Food/Yarsani/Cultural Preferences: Food Preferences: Dislike: Corn. / Spiritual, Cultural Beliefs, Yarsani Practices, Values that Affect Care: no  Food Allergies:   Review of patient's allergies indicates:   Allergen Reactions    Sulfa (sulfonamide antibiotics) Rash     unknown reaction.   has been years            Wound(s):      Wound 25 Skin Tear Right Elbow #2-Tissue loss description: Partial thickness       Wound 25 Other (comment) Left lower Leg-Tissue loss description: Not applicable     Overview/Hospital Course:    (): Patient reports good appetite now and okay prior to admit with some difficulty chewing d/t dentures. Patient averaged 67%-3 meals meeting 100% of nutrient needs and minimal weight loss per time frame. GI: WDL/LBM-, : WDL except VOIDING abilities and CATHETER, FUNCTIONAL SCREEN:Eatin - assistive person, Nutrition: 3-->adequate,Kevin Score: 16, NO EDEMA NOTED, 24 HR I/O: 3935/2500.     Anthropometrics    Height: 5' 4" (162.6 cm) Height Method: Stated  Last Weight: 51.4 kg (113 lb 5.1 oz) (25) Weight Method: Bed Scale  BMI (Calculated): 19.4  BMI Classification: normal (BMI 18.5-24.9)     Ideal Body Weight (IBW), Female: 120 lb     % Ideal Body Weight, Female (lb): 94.43 %                           "   Usual Weight Provided By: EMR weight history    Wt Readings from Last 5 Encounters:   06/28/25 51.4 kg (113 lb 5.1 oz)   04/23/25 52.2 kg (115 lb)   08/28/24 54.4 kg (120 lb)   07/06/23 55.3 kg (122 lb)   03/29/23 55.8 kg (123 lb)     Weight Change(s) Since Admission:  Admit Weight: 50.9 kg (112 lb 3.2 oz) (06/28/25 1950)      Estimated Needs    Weight Used For Calorie Calculations: 51.4 kg (113 lb 5.1 oz)  Energy Calorie Requirements (kcal): 3067-3841 KCAL (30-35 KCAL/KG CBW)  Energy Need Method: Kcal/kg  Weight Used For Protein Calculations: 51.4 kg (113 lb 5.1 oz)  Protein Requirements: 41-51 GM PRO (0.8-1.0 GM/KG CBW)  Fluid Requirements (mL): 1285 ML H2O (25 ML/KG CBW)        Enteral Nutrition    Patient not receiving enteral nutrition at this time.    Parenteral Nutrition    Patient not receiving parenteral nutrition support at this time.    Evaluation of Received Nutrient Intake    Calories: not meeting estimated needs  Protein: not meeting estimated needs    Patient Education    Not applicable.         Nutrition Diagnosis     PES: Inadequate energy intake related to Other (comment) (inability to consume sufficient energy) as evidenced by BMI.  New    Nutrition Interventions     Intervention(s): general/healthful diet, modified composition of meals/snacks, commercial beverage, and collaboration with other providers    Goal: Meet Greater than 75% of nutritional needs by discharge. (new)    Goal: Maintain weight throughout hospitalization. (new)    Nutrition Goals & Monitoring     Dietitian will monitor: energy intake, weight, weight change, and gastrointestinal profile  Discharge planning:    continue Regular diet  Next Date to be Seen by RD: 07/07/25  Please consult if re-assessment needed sooner.

## 2025-06-30 NOTE — PLAN OF CARE
Problem: Adult Inpatient Plan of Care  Goal: Plan of Care Review  Outcome: Progressing  Goal: Patient-Specific Goal (Individualized)  Outcome: Progressing  Goal: Absence of Hospital-Acquired Illness or Injury  Outcome: Progressing  Goal: Optimal Comfort and Wellbeing  Outcome: Progressing  Goal: Readiness for Transition of Care  Outcome: Progressing     Problem: Wound  Goal: Optimal Coping  Outcome: Progressing  Goal: Optimal Functional Ability  Outcome: Progressing  Goal: Absence of Infection Signs and Symptoms  Outcome: Progressing  Goal: Improved Oral Intake  Outcome: Progressing  Goal: Optimal Pain Control and Function  Outcome: Progressing  Goal: Skin Health and Integrity  Outcome: Progressing  Goal: Optimal Wound Healing  Outcome: Progressing     Problem: Sepsis/Septic Shock  Goal: Optimal Coping  Outcome: Progressing  Goal: Absence of Bleeding  Outcome: Progressing  Goal: Blood Glucose Level Within Targeted Range  Outcome: Progressing  Goal: Absence of Infection Signs and Symptoms  Outcome: Progressing  Goal: Optimal Nutrition Intake  Outcome: Progressing     Problem: Skin Injury Risk Increased  Goal: Skin Health and Integrity  Outcome: Progressing     Problem: Fall Injury Risk  Goal: Absence of Fall and Fall-Related Injury  Outcome: Progressing     Problem: Infection  Goal: Absence of Infection Signs and Symptoms  Outcome: Progressing    More alert tonight;sleeping well;daughter at bedside;uneventful night;meds administered as ordered per md

## 2025-06-30 NOTE — ASSESSMENT & PLAN NOTE
The likely etiology of thrombocytopenia is infection. The patients 3 most recent labs are listed below.  Recent Labs     06/28/25 2020 06/29/25  0557 06/30/25  0447    146 144     Plan  - Will transfuse if platelet count is <50k (if undergoing surgical procedure or have active bleeding).  -

## 2025-06-30 NOTE — ASSESSMENT & PLAN NOTE
Anemia is likely due to Iron deficiency. Most recent hemoglobin and hematocrit are listed below.  Recent Labs     06/28/25  2020 06/29/25  0557 06/30/25  0447   HGB 11.8 11.5* 11.0*   HCT 35.5* 35.2* 34.6*     Plan  - Monitor serial CBC: Daily  - Transfuse PRBC if patient becomes hemodynamically unstable, symptomatic or H/H drops below 7/21.  - Patient has not received any PRBC transfusions to date  - Patient's anemia is currently stable  -

## 2025-06-30 NOTE — PLAN OF CARE
06/30/25 1200   Medicare Message   Important Message from Medicare regarding Discharge Appeal Rights Explained to patient/caregiver;Signed/date by patient/caregiver;Given to patient/caregiver   Date IMM was signed 06/30/25   Time IMM was signed 0927

## 2025-06-30 NOTE — PLAN OF CARE
Problem: Physical Therapy  Goal: Physical Therapy Goal  Description: Goals to be met by: discharge     Patient will increase functional independence with mobility by performin. Supine to sit with Stand-by Assistance  2. Sit to stand transfer with Stand-by Assistance  3. Gait  x 50 feet with Stand-by Assistance using Rolling Walker.     Outcome: Progressing

## 2025-06-30 NOTE — ASSESSMENT & PLAN NOTE
Patient with noted electrolyte deficiencies with Hypo-Magnesemia. Latest electrolytes have been reviewed and values are   Magnesium Level   Date Value Ref Range Status   06/30/2025 2.10 1.80 - 2.40 mg/dL Final   . Will continue to monitor electrolytes closely. Will replace the affected electrolytes and repeat labs to be done after interventions completed.    improved

## 2025-06-30 NOTE — PLAN OF CARE
06/30/25 0948   Discharge Assessment   Assessment Type Discharge Planning Assessment   Confirmed/corrected address, phone number and insurance Yes   Confirmed Demographics Correct on Facesheet   Source of Information patient;family   Communicated MARTI with patient/caregiver Yes   People in Home child(iraida), adult   Name(s) of People in Home Tarsha Rodríguez   Facility Arrived From: Home   Do you expect to return to your current living situation? Yes   Do you have help at home or someone to help you manage your care at home? Yes   Who are your caregiver(s) and their phone number(s)? Tarsha Rodríguez  [phone]   Prior to hospitilization cognitive status: Alert/Oriented   Current cognitive status: Alert/Oriented   Walking or Climbing Stairs Difficulty yes   Walking or Climbing Stairs ambulation difficulty, requires equipment   Mobility Management Walker, Rollator, Wheelchair   Dressing/Bathing Difficulty yes   Dressing/Bathing bathing difficulty, assistance 1 person   Dressing/Bathing Management Daughter assists   Equipment Currently Used at Home wheelchair;walker, standard;rollator   Readmission within 30 days? No   Patient currently being followed by outpatient case management? No   Do you currently have service(s) that help you manage your care at home? No   Do you take prescription medications? Yes   Do you have prescription coverage? Yes   Do you have any problems affording any of your prescribed medications? No   Is the patient taking medications as prescribed? yes   Who is going to help you get home at discharge? Daughter   How do you get to doctors appointments? family or friend will provide   Are you on dialysis? No   Do you take coumadin? No   Discharge Plan A Home Health   DME Needed Upon Discharge  none   Discharge Plan discussed with: Patient;Adult children   Transition of Care Barriers Mobility   Physical Activity   On average, how many days per week do you engage in moderate to  strenuous exercise (like a brisk walk)? 3 days   On average, how many minutes do you engage in exercise at this level? 20 min   Alcohol Use   Q1: How often do you have a drink containing alcohol? Never   Q2: How many drinks containing alcohol do you have on a typical day when you are drinking? None   Q3: How often do you have six or more drinks on one occasion? Never     Patient's daughter-Tarsha interested in home health at discharge.  She states they have used [facility] Galvan MD in the past and would like to use them again.

## 2025-06-30 NOTE — PROGRESS NOTES
[facility]/Surg  [facility] Medicine  PROGRESS NOTE    Patient Name: Jnenifer Rob  MRN: [id number]  Patient Class: IP- Inpatient  Admission Date: 6/28/2025  Attending Physician: Riana Leal MD   Primary Care Provider: Zak Esteves MD         Patient information was obtained from patient and ER records.     Subjective:     Principal Problem:Severe sepsis    Chief Complaint:   Chief Complaint   Patient presents with    Fever    Altered Mental Status    Fall     Family reports pt fell today, unwitnessed, wound to right elbow, pt reports she hit her head, also c/o LLE redness, swelling, warmth. Hx: parkinson's.        HPI: 84-year-old F patient with PMH of Parkinson's disease and bipolar disorder presents to the ER with a fall at home after she tripped on the floor. Family found her on the floor. Patient denies having any loss of consciousness, seizure activity, chest pain shortness of breath or abdominal pain. Family report swelling and erythema on the LLE that started in the calf and then extended circumferentially. Patient on arrival has a temperature of 102.4° F. Labs showed WBC of 20.84, phosphorus of 2.3, Mg 1.7, BNP 2670 and lactate 2.5. She was started on IV antibiotics and admitted for further management.     06/29/2025 pt admitted with fecer and sepsis thought to be due to RLE cellulitis, h/ o Parkinson's disease daughter noted limping on right leg x 1-2 days prior to fever.  06/30/2025 more alert today, left LE redness is less    Review of Systems   Unable to perform ROS: Mental status change     Vitals:    06/30/25 1503   BP: 123/70   Pulse: 85   Resp: 16   Temp: 97.6 °F (36.4 °C)     Physical Exam  Vitals and nursing note reviewed. Exam conducted with a chaperone present.   Constitutional:       General: She is not in acute distress.     Appearance: Normal appearance. She is normal weight. She is not ill-appearing.   HENT:      Head: Atraumatic.   Cardiovascular:      Rate  and Rhythm: Normal rate and regular rhythm.      Pulses: Normal pulses.      Heart sounds: Normal heart sounds.   Pulmonary:      Effort: Pulmonary effort is normal.      Breath sounds: Normal breath sounds.   Abdominal:      General: Abdomen is flat. Bowel sounds are normal.      Palpations: Abdomen is soft.   Musculoskeletal:      Right lower leg: No edema.      Left lower leg: No edema.   Skin:     General: Skin is warm and dry.      Findings: Erythema (less today) and rash present. No lesion.      Comments: Left LE redness, dry skin areas no floculence or lesion   Neurological:      General: No focal deficit present.      Mental Status: She is alert. Mental status is at baseline.   Psychiatric:         Mood and Affect: Mood normal.         Behavior: Behavior normal.           Assessment/Plan:     Assessment & Plan  Severe sepsis  - 2/2 cellulitis  - lactate 2.5  - started on IV antibiotics and will continue  imrpoving  Parkinson's disease  - home meds  Bipolar disorder, unspecified  - home meds  Cellulitis  - IV vancomycin and zosyn  Redness improving  Hypophosphatemia  - replete  Hypomagnesemia  Patient with noted electrolyte deficiencies with Hypo-Magnesemia. Latest electrolytes have been reviewed and values are   Magnesium Level   Date Value Ref Range Status   06/30/2025 2.10 1.80 - 2.40 mg/dL Final   . Will continue to monitor electrolytes closely. Will replace the affected electrolytes and repeat labs to be done after interventions completed.    improved  Anemia  Anemia is likely due to Iron deficiency. Most recent hemoglobin and hematocrit are listed below.  Recent Labs     06/28/25  2020 06/29/25  0557 06/30/25  0447   HGB 11.8 11.5* 11.0*   HCT 35.5* 35.2* 34.6*     Plan  - Monitor serial CBC: Daily  - Transfuse PRBC if patient becomes hemodynamically unstable, symptomatic or H/H drops below 7/21.  - Patient has not received any PRBC transfusions to date  - Patient's anemia is currently stable  -     Hyponatremia  Hyponatremia is likely due to Dehydration/hypovolemia. The patient's most recent sodium results are listed below.  Recent Labs     06/28/25 2020 06/29/25 0557 06/30/25  0447   * 139 143     Plan  - Correct the sodium by 4-6mEq in 24 hours.   - Obtain the following studies: rsg.  - Will treat the hyponatremia with IV fluids as follows: ns  - Monitor sodium Daily.   - Patient hyponatremia is stable  -    Thrombocytopenia  The likely etiology of thrombocytopenia is infection. The patients 3 most recent labs are listed below.  Recent Labs     06/28/25 2020 06/29/25 0557 06/30/25 0447    146 144     Plan  - Will transfuse if platelet count is <50k (if undergoing surgical procedure or have active bleeding).  -      Primary hypertension  Patient's blood pressure range in the last 24 hours was: BP  Min: 123/70  Max: 151/79.The patient's inpatient anti-hypertensive regimen is listed below:  Current Antihypertensives  metoprolol succinate (TOPROL-XL) 24 hr tablet 25 mg, Daily, Oral  losartan tablet 50 mg, Daily, Oral  amLODIPine tablet 2.5 mg, Nightly, Oral  cloNIDine tablet 0.2 mg, Every 6 hours PRN, Oral  hydrALAZINE injection 10 mg, Every 4 hours PRN, Intravenous    Plan  - BP is controlled, no changes needed to their regimen  -  contiue current meds    VTE Risk Mitigation (From admission, onward)           Ordered     enoxaparin injection 40 mg  Daily         06/28/25 2208     IP VTE HIGH RISK PATIENT  Once         06/28/25 2208                    Service was provided using HIPAA compliant web platform using SOC for audio/visual equipment.   Patient Location: Hospital  Provider Location: Home ([place], [place])  Participants on call: bedside RN, patient  Consent was obtained, and the patient was seen with nurse assisting from the bedside.                  Riana Leal MD  Department of Hospital Medicine  [facility]/Surg

## 2025-06-30 NOTE — PLAN OF CARE
Problem: Adult Inpatient Plan of Care  Goal: Plan of Care Review  6/30/2025 1718 by Zoila Tang RN  Outcome: Progressing  6/30/2025 1717 by Zoila Tang RN  Outcome: Progressing  Goal: Patient-Specific Goal (Individualized)  6/30/2025 1718 by Zoila Tang RN  Outcome: Progressing  6/30/2025 1717 by Zoila Tang RN  Outcome: Progressing  Goal: Absence of Hospital-Acquired Illness or Injury  6/30/2025 1718 by Zoila Tang RN  Outcome: Progressing  6/30/2025 1717 by Zoila Tang RN  Outcome: Progressing  Goal: Optimal Comfort and Wellbeing  6/30/2025 1718 by Zoila Tang RN  Outcome: Progressing  6/30/2025 1717 by Zoila Tang RN  Outcome: Progressing  Goal: Readiness for Transition of Care  6/30/2025 1718 by Zoila Tang RN  Outcome: Progressing  6/30/2025 1717 by Zoila Tang RN  Outcome: Progressing     Problem: Wound  Goal: Optimal Coping  6/30/2025 1718 by Zoila Tang, RN  Outcome: Progressing  6/30/2025 1717 by Zoila Tang RN  Outcome: Progressing  Goal: Optimal Functional Ability  6/30/2025 1718 by Zoila Tang, RN  Outcome: Progressing  6/30/2025 1717 by Zoila Tang RN  Outcome: Progressing  Goal: Absence of Infection Signs and Symptoms  6/30/2025 1718 by Zoila Tang, RN  Outcome: Progressing  6/30/2025 1717 by Zoila Tang RN  Outcome: Progressing  Goal: Improved Oral Intake  6/30/2025 1718 by Zoila Tang, RN  Outcome: Progressing  6/30/2025 1717 by Zoila Tang RN  Outcome: Progressing  Goal: Optimal Pain Control and Function  6/30/2025 1718 by Zoila Tang RN  Outcome: Progressing  6/30/2025 1717 by Zoila Tang RN  Outcome: Progressing  Goal: Skin Health and Integrity  6/30/2025 1718 by Zoila Tang RN  Outcome: Progressing  6/30/2025 1717 by Zoila Tang RN  Outcome: Progressing  Goal: Optimal Wound Healing  6/30/2025 1718 by Zoila Tang RN  Outcome: Progressing  6/30/2025 1717 by Clyde  CLIFTON Cummings  Outcome: Progressing     Problem: Sepsis/Septic Shock  Goal: Optimal Coping  6/30/2025 1718 by Zoila Tang RN  Outcome: Progressing  6/30/2025 1717 by Zoila Tang RN  Outcome: Progressing  Goal: Absence of Bleeding  6/30/2025 1718 by Zoila Tang RN  Outcome: Progressing  6/30/2025 1717 by Zoila Tang RN  Outcome: Progressing  Goal: Blood Glucose Level Within Targeted Range  6/30/2025 1718 by Zoila Tang RN  Outcome: Progressing  6/30/2025 1717 by Zoila Tang RN  Outcome: Progressing  Goal: Absence of Infection Signs and Symptoms  6/30/2025 1718 by Zoila Tang RN  Outcome: Progressing  6/30/2025 1717 by Zoila Tang RN  Outcome: Progressing  Goal: Optimal Nutrition Intake  6/30/2025 1718 by Zoila Tang RN  Outcome: Progressing  6/30/2025 1717 by Zoila Tang RN  Outcome: Progressing     Problem: Skin Injury Risk Increased  Goal: Skin Health and Integrity  6/30/2025 1718 by Zoila Tang RN  Outcome: Progressing  6/30/2025 1717 by Zoila Tang RN  Outcome: Progressing     Problem: Fall Injury Risk  Goal: Absence of Fall and Fall-Related Injury  6/30/2025 1718 by Zoila Tang RN  Outcome: Progressing  6/30/2025 1717 by Zoila Tang RN  Outcome: Progressing     Problem: Infection  Goal: Absence of Infection Signs and Symptoms  6/30/2025 1718 by Zoila Tang RN  Outcome: Progressing  6/30/2025 1717 by Zoila Tang RN  Outcome: Progressing

## 2025-06-30 NOTE — ASSESSMENT & PLAN NOTE
Hyponatremia is likely due to Dehydration/hypovolemia. The patient's most recent sodium results are listed below.  Recent Labs     06/28/25  2020 06/29/25  0557 06/30/25  0447   * 139 143     Plan  - Correct the sodium by 4-6mEq in 24 hours.   - Obtain the following studies: rsg.  - Will treat the hyponatremia with IV fluids as follows: ns  - Monitor sodium Daily.   - Patient hyponatremia is stable  -

## 2025-07-01 LAB
ALBUMIN SERPL-MCNC: 2.9 G/DL (ref 3.4–5)
ALBUMIN/GLOB SERPL: 1.1 RATIO
ALP SERPL-CCNC: 56 UNIT/L (ref 50–144)
ALT SERPL-CCNC: 10 UNIT/L (ref 1–45)
ANION GAP SERPL CALC-SCNC: 2 MEQ/L (ref 2–13)
AST SERPL-CCNC: 31 UNIT/L (ref 14–36)
BASOPHILS # BLD AUTO: 0.04 X10(3)/MCL (ref 0.01–0.08)
BASOPHILS NFR BLD AUTO: 0.4 % (ref 0.1–1.2)
BILIRUB SERPL-MCNC: 0.3 MG/DL (ref 0–1)
BUN SERPL-MCNC: 15 MG/DL (ref 7–20)
CALCIUM SERPL-MCNC: 8.1 MG/DL (ref 8.4–10.2)
CHLORIDE SERPL-SCNC: 111 MMOL/L (ref 98–110)
CO2 SERPL-SCNC: 29 MMOL/L (ref 21–32)
COLOR STL: ABNORMAL
CONSISTENCY STL: ABNORMAL
CREAT SERPL-MCNC: 1.02 MG/DL (ref 0.66–1.25)
CREAT/UREA NIT SERPL: 15 (ref 12–20)
EOSINOPHIL # BLD AUTO: 0.17 X10(3)/MCL (ref 0.04–0.36)
EOSINOPHIL NFR BLD AUTO: 1.8 % (ref 0.7–7)
ERYTHROCYTE [DISTWIDTH] IN BLOOD BY AUTOMATED COUNT: 13 % (ref 11–14.5)
GFR SERPLBLD CREATININE-BSD FMLA CKD-EPI: 54 ML/MIN/1.73/M2
GLOBULIN SER-MCNC: 2.7 GM/DL (ref 2–3.9)
GLUCOSE SERPL-MCNC: 88 MG/DL (ref 70–115)
HCT VFR BLD AUTO: 34.3 % (ref 36–48)
HEMOCCULT SP1 STL QL: POSITIVE
HEMOCCULT SP2 STL QL: POSITIVE
HEMOCCULT SP3 STL QL: POSITIVE
HGB BLD-MCNC: 11.2 G/DL (ref 11.8–16)
IMM GRANULOCYTES # BLD AUTO: 0.04 X10(3)/MCL (ref 0–0.03)
IMM GRANULOCYTES NFR BLD AUTO: 0.4 % (ref 0–0.5)
LYMPHOCYTES # BLD AUTO: 1.49 X10(3)/MCL (ref 1.16–3.74)
LYMPHOCYTES NFR BLD AUTO: 15.6 % (ref 20–55)
MCH RBC QN AUTO: 32.4 PG (ref 27–34)
MCHC RBC AUTO-ENTMCNC: 32.7 G/DL (ref 31–37)
MCV RBC AUTO: 99.1 FL (ref 79–99)
MONOCYTES # BLD AUTO: 0.73 X10(3)/MCL (ref 0.24–0.36)
MONOCYTES NFR BLD AUTO: 7.6 % (ref 4.7–12.5)
NEUTROPHILS # BLD AUTO: 7.1 X10(3)/MCL (ref 1.56–6.13)
NEUTROPHILS NFR BLD AUTO: 74.2 % (ref 37–73)
NRBC BLD AUTO-RTO: 0 %
OHS QRS DURATION: 126 MS
OHS QTC CALCULATION: 492 MS
PLATELET # BLD AUTO: 116 X10(3)/MCL (ref 140–371)
PMV BLD AUTO: 10.9 FL (ref 9.4–12.4)
POTASSIUM SERPL-SCNC: 3.7 MMOL/L (ref 3.5–5.1)
PROT SERPL-MCNC: 5.6 GM/DL (ref 6.3–8.2)
RBC # BLD AUTO: 3.46 X10(6)/MCL (ref 4–5.1)
SODIUM SERPL-SCNC: 142 MMOL/L (ref 136–145)
VANCOMYCIN TROUGH SERPL-MCNC: 8.7 UG/ML (ref 10–20)
WBC # BLD AUTO: 9.57 X10(3)/MCL (ref 4–11.5)

## 2025-07-01 PROCEDURE — 82272 OCCULT BLD FECES 1-3 TESTS: CPT | Performed by: FAMILY MEDICINE

## 2025-07-01 PROCEDURE — 25000003 PHARM REV CODE 250: Performed by: INTERNAL MEDICINE

## 2025-07-01 PROCEDURE — 63600175 PHARM REV CODE 636 W HCPCS: Performed by: INTERNAL MEDICINE

## 2025-07-01 PROCEDURE — 97116 GAIT TRAINING THERAPY: CPT

## 2025-07-01 PROCEDURE — 36415 COLL VENOUS BLD VENIPUNCTURE: CPT | Performed by: INTERNAL MEDICINE

## 2025-07-01 PROCEDURE — 63600175 PHARM REV CODE 636 W HCPCS: Performed by: FAMILY MEDICINE

## 2025-07-01 PROCEDURE — 36415 COLL VENOUS BLD VENIPUNCTURE: CPT | Performed by: FAMILY MEDICINE

## 2025-07-01 PROCEDURE — 21400001 HC TELEMETRY ROOM

## 2025-07-01 PROCEDURE — 85025 COMPLETE CBC W/AUTO DIFF WBC: CPT | Performed by: INTERNAL MEDICINE

## 2025-07-01 PROCEDURE — 80053 COMPREHEN METABOLIC PANEL: CPT | Performed by: INTERNAL MEDICINE

## 2025-07-01 PROCEDURE — 94761 N-INVAS EAR/PLS OXIMETRY MLT: CPT

## 2025-07-01 PROCEDURE — 25000003 PHARM REV CODE 250: Performed by: FAMILY MEDICINE

## 2025-07-01 PROCEDURE — 80202 ASSAY OF VANCOMYCIN: CPT | Performed by: FAMILY MEDICINE

## 2025-07-01 RX ADMIN — FERROUS SULFATE TAB 325 MG (65 MG ELEMENTAL FE) 1 EACH: 325 (65 FE) TAB at 08:07

## 2025-07-01 RX ADMIN — CARBIDOPA AND LEVODOPA 1 TABLET: 25; 100 TABLET ORAL at 12:07

## 2025-07-01 RX ADMIN — CARBIDOPA AND LEVODOPA 1 TABLET: 25; 100 TABLET ORAL at 08:07

## 2025-07-01 RX ADMIN — SODIUM CHLORIDE 1000 MG: 9 INJECTION, SOLUTION INTRAVENOUS at 03:07

## 2025-07-01 RX ADMIN — CLONIDINE HYDROCHLORIDE 0.2 MG: 0.1 TABLET ORAL at 05:07

## 2025-07-01 RX ADMIN — SODIUM CHLORIDE: 9 INJECTION, SOLUTION INTRAVENOUS at 01:07

## 2025-07-01 RX ADMIN — PIPERACILLIN AND TAZOBACTAM 4.5 G: 4; .5 INJECTION, POWDER, FOR SOLUTION INTRAVENOUS; PARENTERAL at 09:07

## 2025-07-01 RX ADMIN — BISACODYL 10 MG: 5 TABLET, COATED ORAL at 08:07

## 2025-07-01 RX ADMIN — AMLODIPINE BESYLATE 2.5 MG: 2.5 TABLET ORAL at 09:07

## 2025-07-01 RX ADMIN — CARBIDOPA AND LEVODOPA 1 TABLET: 25; 100 TABLET ORAL at 04:07

## 2025-07-01 RX ADMIN — PIPERACILLIN AND TAZOBACTAM 4.5 G: 4; .5 INJECTION, POWDER, FOR SOLUTION INTRAVENOUS; PARENTERAL at 04:07

## 2025-07-01 RX ADMIN — LOSARTAN POTASSIUM 50 MG: 50 TABLET, FILM COATED ORAL at 10:07

## 2025-07-01 RX ADMIN — DEXTROSE MONOHYDRATE: 5 INJECTION, SOLUTION INTRAVENOUS at 09:07

## 2025-07-01 RX ADMIN — LORAZEPAM 0.5 MG: 0.5 TABLET ORAL at 04:07

## 2025-07-01 RX ADMIN — CARBIDOPA AND LEVODOPA 1 TABLET: 10; 100 TABLET ORAL at 08:07

## 2025-07-01 RX ADMIN — PIPERACILLIN AND TAZOBACTAM 4.5 G: 4; .5 INJECTION, POWDER, FOR SOLUTION INTRAVENOUS; PARENTERAL at 12:07

## 2025-07-01 RX ADMIN — METOPROLOL SUCCINATE 25 MG: 25 TABLET, EXTENDED RELEASE ORAL at 10:07

## 2025-07-01 RX ADMIN — ENOXAPARIN SODIUM 40 MG: 40 INJECTION SUBCUTANEOUS at 04:07

## 2025-07-01 RX ADMIN — CARBIDOPA AND LEVODOPA 1 TABLET: 10; 100 TABLET ORAL at 04:07

## 2025-07-01 RX ADMIN — CARBIDOPA AND LEVODOPA 1 TABLET: 10; 100 TABLET ORAL at 12:07

## 2025-07-01 RX ADMIN — SODIUM CHLORIDE: 9 INJECTION, SOLUTION INTRAVENOUS at 10:07

## 2025-07-01 RX ADMIN — DIVALPROEX SODIUM 500 MG: 250 TABLET, DELAYED RELEASE ORAL at 09:07

## 2025-07-01 RX ADMIN — VENLAFAXINE HYDROCHLORIDE 75 MG: 75 CAPSULE, EXTENDED RELEASE ORAL at 08:07

## 2025-07-01 RX ADMIN — CARBIDOPA AND LEVODOPA 1 TABLET: 10; 100 TABLET ORAL at 09:07

## 2025-07-01 NOTE — PROGRESS NOTES
[facility]/Surg  [facility] Medicine  PROGRESS NOTE    Patient Name: Jennifer Rob  MRN: [id number]  Patient Class: IP- Inpatient  Admission Date: 6/28/2025  Attending Physician: Riana Leal MD   Primary Care Provider: Zak Esteves MD         Patient information was obtained from patient and ER records.     Subjective:     Principal Problem:Severe sepsis    Chief Complaint:   Chief Complaint   Patient presents with    Fever    Altered Mental Status    Fall     Family reports pt fell today, unwitnessed, wound to right elbow, pt reports she hit her head, also c/o LLE redness, swelling, warmth. Hx: parkinson's.        HPI: 84-year-old F patient with PMH of Parkinson's disease and bipolar disorder presents to the ER with a fall at home after she tripped on the floor. Family found her on the floor. Patient denies having any loss of consciousness, seizure activity, chest pain shortness of breath or abdominal pain. Family report swelling and erythema on the LLE that started in the calf and then extended circumferentially. Patient on arrival has a temperature of 102.4° F. Labs showed WBC of 20.84, phosphorus of 2.3, Mg 1.7, BNP 2670 and lactate 2.5. She was started on IV antibiotics and admitted for further management.     06/29/2025 pt admitted with fecer and sepsis thought to be due to RLE cellulitis, h/ o Parkinson's disease daughter noted limping on right leg x 1-2 days prior to fever.  06/30/2025 more alert today, left LE redness is less  07/01/2025 redness to LLE a bit more and a bit more swollen, WBC stable  Ovrall feeling better    Review of Systems   Unable to perform ROS: Mental status change     Vitals:    07/01/25 1105   BP: (!) 161/65   Pulse: 63   Resp:    Temp: 98 °F (36.7 °C)     Physical Exam  Vitals and nursing note reviewed. Exam conducted with a chaperone present.   Constitutional:       General: She is not in acute distress.     Appearance: Normal appearance. She is  normal weight. She is not ill-appearing.   HENT:      Head: Atraumatic.   Cardiovascular:      Rate and Rhythm: Normal rate and regular rhythm.      Pulses: Normal pulses.      Heart sounds: Normal heart sounds.   Pulmonary:      Effort: Pulmonary effort is normal.      Breath sounds: Normal breath sounds.   Abdominal:      General: Abdomen is flat. Bowel sounds are normal.      Palpations: Abdomen is soft.   Musculoskeletal:      Right lower leg: No edema.      Left lower leg: No edema.   Skin:     General: Skin is warm and dry.      Findings: Erythema (less today) and rash present. No lesion.      Comments: Left LE redness, dry skin areas no floculence or lesion  LLE slightly increased edema and redness today   Neurological:      General: No focal deficit present.      Mental Status: She is alert. Mental status is at baseline.   Psychiatric:         Mood and Affect: Mood normal.         Behavior: Behavior normal.           Assessment/Plan:     Assessment & Plan  Severe sepsis  - 2/2 cellulitis  - lactate 2.5  - started on IV antibiotics and will continue  imrpoving  Parkinson's disease  - home meds  Bipolar disorder, unspecified  - home meds  Cellulitis  - IV vancomycin and zosyn  Redness a bit worse today  Get US LLE r/o DVT due to increased swelling  Hypophosphatemia  - replete  Hypomagnesemia  Patient with noted electrolyte deficiencies with Hypo-Magnesemia. Latest electrolytes have been reviewed and values are   Magnesium Level   Date Value Ref Range Status   06/30/2025 2.10 1.80 - 2.40 mg/dL Final   . Will continue to monitor electrolytes closely. Will replace the affected electrolytes and repeat labs to be done after interventions completed.    improved  Anemia  Anemia is likely due to Iron deficiency. Most recent hemoglobin and hematocrit are listed below.  Recent Labs     06/29/25  0557 06/30/25  0447 07/01/25  0448   HGB 11.5* 11.0* 11.2*   HCT 35.2* 34.6* 34.3*     Plan  - Monitor serial CBC: Daily  -  Transfuse PRBC if patient becomes hemodynamically unstable, symptomatic or H/H drops below 7/21.  - Patient has not received any PRBC transfusions to date  - Patient's anemia is currently stable  -    Hyponatremia  Hyponatremia is likely due to Dehydration/hypovolemia. The patient's most recent sodium results are listed below.  Recent Labs     06/29/25 0557 06/30/25 0447 07/01/25 0448    143 142     Plan  - Correct the sodium by 4-6mEq in 24 hours.   - Obtain the following studies: rsg.  - Will treat the hyponatremia with IV fluids as follows: ns  - Monitor sodium Daily.   - Patient hyponatremia is stable  -    Thrombocytopenia  The likely etiology of thrombocytopenia is infection. The patients 3 most recent labs are listed below.  Recent Labs     06/29/25 0557 06/30/25 0447 07/01/25 0448    144 116*     Plan  - Will transfuse if platelet count is <50k (if undergoing surgical procedure or have active bleeding).  -      Primary hypertension  Patient's blood pressure range in the last 24 hours was: BP  Min: 109/44  Max: 169/69.The patient's inpatient anti-hypertensive regimen is listed below:  Current Antihypertensives  metoprolol succinate (TOPROL-XL) 24 hr tablet 25 mg, Daily, Oral  losartan tablet 50 mg, Daily, Oral  amLODIPine tablet 2.5 mg, Nightly, Oral  cloNIDine tablet 0.2 mg, Every 6 hours PRN, Oral  hydrALAZINE injection 10 mg, Every 4 hours PRN, Intravenous    Plan  - BP is controlled, no changes needed to their regimen  -  contiue current meds    VTE Risk Mitigation (From admission, onward)           Ordered     enoxaparin injection 40 mg  Daily         06/28/25 2208     IP VTE HIGH RISK PATIENT  Once         06/28/25 2208                                  Riana Leal MD  Department of Hospital Medicine  [facility]/S[facility]

## 2025-07-01 NOTE — ASSESSMENT & PLAN NOTE
- IV vancomycin and zosyn  Redness a bit worse today  Get US LLE r/o DVT due to increased swelling

## 2025-07-01 NOTE — NURSING
Pt blood pressure 163/63, spoke to daughter and she wishes to let pt ativan work and see if blood pressure comes down in an hour if not will give prn blood pressure meds.

## 2025-07-01 NOTE — PT/OT/SLP PROGRESS
Physical Therapy Treatment    Patient Name:  Jennifer Rob   MRN:  [id number]    Recommendations:     Discharge Recommendations: Low Intensity Therapy  Discharge Equipment Recommendations: none  Barriers to discharge: current medical status    Assessment:     Jennifer Rob is a 84 y.o. female admitted with a medical diagnosis of Severe sepsis.  She presents with the following impairments/functional limitations: weakness, impaired endurance, impaired self care skills, impaired functional mobility, gait instability, impaired balance, impaired cognition, decreased lower extremity function, decreased safety awareness     Patient found with HOB elevated. Agreeable to therapy today. Patient tolerated supine to sit with min A. Patient then tolerated functional sit to stand with min A using RW. Patient able to tolerated 125 feet in hallways with RW with v/c and t/c for walker management. Patient required min/CGA during gait. Patient returned to room and sat up in recliner. Patient positioned for comfort with legs elevated due to swollen LLE. Patient left up in chair with all needs in reach, family present.     Rehab Prognosis: Good; patient would benefit from acute skilled PT services to address these deficits and reach maximum level of function.    Recent Surgery: * No surgery found *      Plan:     During this hospitalization, patient to be seen 5 x/week (5-6x weekly/1-2x daily) to address the identified rehab impairments via gait training, therapeutic exercises, therapeutic activities and progress toward the following goals:    Plan of Care Expires:  07/30/25    Subjective     Chief Complaint: none verbalized today  Patient/Family Comments/goals: to go home  Pain/Comfort:         Objective:     Communicated with nursing prior to session.  Patient found HOB elevated with peripheral IV, PureWick upon PT entry to room.     General Precautions: Standard, fall  Orthopedic Precautions: N/A  Braces: N/A  Respiratory  Status: Room air     Functional Mobility:  Bed Mobility:     Supine to Sit: minimum assistance  Transfers:     Sit to Stand:  minimum assistance with rolling walker  Gait: 125 feet with RW with min/CGA      AM-PAC 6 CLICK MOBILITY          Treatment & Education:  See above    Patient left up in chair with all lines intact, call button in reach, chair alarm on, nurse notified, and family present..    GOALS:   Multidisciplinary Problems       Physical Therapy Goals          Problem: Physical Therapy    Goal Priority Disciplines Outcome Interventions   Physical Therapy Goal     PT, PT/OT Progressing    Description: Goals to be met by: discharge     Patient will increase functional independence with mobility by performin. Supine to sit with Stand-by Assistance  2. Sit to stand transfer with Stand-by Assistance  3. Gait  x 50 feet with Stand-by Assistance using Rolling Walker.                          DME Justifications:  No DME recommended requiring DME justifications    Time Tracking:     PT Received On: 25  PT Start Time: 0952     PT Stop Time: 1007  PT Total Time (min): 15 min     Billable Minutes: Gait Training 15    Treatment Type: Treatment  PT/PTA: PT           2025

## 2025-07-01 NOTE — ASSESSMENT & PLAN NOTE
Hyponatremia is likely due to Dehydration/hypovolemia. The patient's most recent sodium results are listed below.  Recent Labs     06/29/25  0557 06/30/25  0447 07/01/25  0448    143 142     Plan  - Correct the sodium by 4-6mEq in 24 hours.   - Obtain the following studies: rsg.  - Will treat the hyponatremia with IV fluids as follows: ns  - Monitor sodium Daily.   - Patient hyponatremia is stable  -

## 2025-07-01 NOTE — ASSESSMENT & PLAN NOTE
Patient's blood pressure range in the last 24 hours was: BP  Min: 109/44  Max: 169/69.The patient's inpatient anti-hypertensive regimen is listed below:  Current Antihypertensives  metoprolol succinate (TOPROL-XL) 24 hr tablet 25 mg, Daily, Oral  losartan tablet 50 mg, Daily, Oral  amLODIPine tablet 2.5 mg, Nightly, Oral  cloNIDine tablet 0.2 mg, Every 6 hours PRN, Oral  hydrALAZINE injection 10 mg, Every 4 hours PRN, Intravenous    Plan  - BP is controlled, no changes needed to their regimen  -  contiue current meds

## 2025-07-01 NOTE — ASSESSMENT & PLAN NOTE
The likely etiology of thrombocytopenia is infection. The patients 3 most recent labs are listed below.  Recent Labs     06/29/25  0557 06/30/25  0447 07/01/25  0448    144 116*     Plan  - Will transfuse if platelet count is <50k (if undergoing surgical procedure or have active bleeding).  -

## 2025-07-01 NOTE — PT/OT/SLP PROGRESS
Physical Therapy Treatment    Patient Name:  Jennifer Rob   MRN:  [id number]    Recommendations:     Discharge Recommendations: Low Intensity Therapy  Discharge Equipment Recommendations: none  Barriers to discharge: current medical status    Assessment:     Jennifer Rob is a 84 y.o. female admitted with a medical diagnosis of Severe sepsis.  She presents with the following impairments/functional limitations: weakness, impaired endurance, impaired self care skills, impaired functional mobility, gait instability, impaired balance, impaired cognition, decreased lower extremity function, decreased safety awareness     Patient found with HOB elevated. Requesting to walk again. Patient tolerated supine to sit with min A. Patient then tolerated functional sit to stand with min A using RW. Patient able to tolerated 125 feet in hallways with RW with v/cs for walker management. Patient required min/CGA during gait. Patient returned to room and sat up in recliner. Patient positioned for comfort with legs elevated. Patient left up in chair with all needs in reach, visitors present.     Rehab Prognosis: Good; patient would benefit from acute skilled PT services to address these deficits and reach maximum level of function.    Recent Surgery: * No surgery found *      Plan:     During this hospitalization, patient to be seen 5 x/week (5-6x weekly/1-2x daily) to address the identified rehab impairments via gait training, therapeutic exercises, therapeutic activities and progress toward the following goals:    Plan of Care Expires:  07/30/25    Subjective     Chief Complaint: none verbalized today  Patient/Family Comments/goals: to go home  Pain/Comfort:         Objective:     Communicated with nursing prior to session.  Patient found HOB elevated with peripheral IV, PureWick upon PT entry to room.     General Precautions: Standard, fall  Orthopedic Precautions: N/A  Braces: N/A  Respiratory Status: Room air     Functional  Mobility:  Bed Mobility:     Supine to Sit: minimum assistance  Transfers:     Sit to Stand:  minimum assistance with rolling walker  Gait: 125 feet with RW with min/CGA      AM-PAC 6 CLICK MOBILITY          Treatment & Education:  See above    Patient left up in chair with all lines intact, call button in reach, chair alarm on, nurse notified, and family present..    GOALS:   Multidisciplinary Problems       Physical Therapy Goals          Problem: Physical Therapy    Goal Priority Disciplines Outcome Interventions   Physical Therapy Goal     PT, PT/OT Progressing    Description: Goals to be met by: discharge     Patient will increase functional independence with mobility by performin. Supine to sit with Stand-by Assistance  2. Sit to stand transfer with Stand-by Assistance  3. Gait  x 50 feet with Stand-by Assistance using Rolling Walker.                          DME Justifications:  No DME recommended requiring DME justifications    Time Tracking:     PT Received On: 25  PT Start Time: 1433     PT Stop Time: 1445  PT Total Time (min): 12 min     Billable Minutes: Gait Training 12    Treatment Type: Treatment  PT/PTA: PT           2025

## 2025-07-01 NOTE — PROGRESS NOTES
Pharmacokinetic Assessment Follow Up: IV Vancomycin    Vancomycin serum concentration assessment(s):    The trough level was drawn correctly and can be used to guide therapy at this time. The measurement is below the desired definitive target range of 15 to 20 mcg/mL.    Vancomycin Regimen Plan:    Change regimen to Vancomycin 1000 mg IV every 12 hours with next serum trough concentration measured at 1500 prior to 3RD dose on 07/02/2023    Drug levels (last 3 results):  Recent Labs   Lab Result Units 06/30/25 2105 07/01/25  1300   Vancomycin Trough ug/ml 8.2* 8.7*       Pharmacy will continue to follow and monitor vancomycin.    Please contact pharmacy at [phone] for questions regarding this assessment.    Thank you for the consult,   Jacque Hurd, [facility].  [phone]     Patient brief summary:  Jennifer Rob is a 84 y.o. female initiated on antimicrobial therapy with IV Vancomycin for treatment of sepsis      Drug Allergies:   Review of patient's allergies indicates:   Allergen Reactions    Sulfa (sulfonamide antibiotics) Rash     unknown reaction.   has been years       Renal Function:   Estimated Creatinine Clearance: 32.7 mL/min (based on SCr of 1.02 mg/dL).,       CBC (last 72 hours):  Recent Labs   Lab Result Units 06/28/25 2020 06/29/25 0557 06/30/25 0447 07/01/25 0448   WBC x10(3)/mcL 20.84* 16.05* 8.94 9.57   Hgb g/dL 11.8 11.5* 11.0* 11.2*   Hct % 35.5* 35.2* 34.6* 34.3*   Platelet x10(3)/mcL 176 146 144 116*   Mono % % 8.0 7.0 8.3 7.6   Eos % % 0.0* 0.2* 1.2 1.8   Basophil % % 0.2 0.2 0.4 0.4       Metabolic Panel (last 72 hours):  Recent Labs   Lab Result Units 06/28/25 2020 06/28/25 2046 06/29/25 0557 06/30/25 0447 07/01/25  0448   Sodium mmol/L 131*  --  139 143 142   Potassium mmol/L 4.4  --  4.2 4.2 3.7   Chloride mmol/L 98  --  108 114* 111*   CO2 mmol/L 27  --  28 28 29   Glucose mg/dL 158*  --  97 83 88   Glucose, UA   --  Negative  --   --   --    Blood Urea Nitrogen mg/dL 25*   --  17 15 15   Creatinine mg/dL 1.09  --  1.12 1.08 1.02   Albumin g/dL 4.2  --  3.2* 2.8* 2.9*   Bilirubin Total mg/dL 0.6  --  0.4 0.3 0.3   ALP unit/L 62  --  56 53 56   AST unit/L 27  --  32 35 31   ALT unit/L 13  --  12 13 10   Magnesium Level mg/dL 1.70*  --   --  2.10  --    Phosphorus Level mg/dL 2.3*  --   --   --   --        Microbiologic Results:  Microbiology Results (last 7 days)       Procedure Component Value Units Date/Time    Blood Culture x two cultures. Draw prior to antibiotics [[id number]] Collected: 06/28/25 2020    Order Status: Completed Specimen: Blood Updated: 06/30/25 2201     Blood Culture No Growth At 48 Hours    Blood Culture x two cultures. Draw prior to antibiotics [[id number]] Collected: 06/28/25 2033    Order Status: Completed Specimen: Blood Updated: 06/30/25 2201     Blood Culture No Growth At 48 Hours

## 2025-07-01 NOTE — ASSESSMENT & PLAN NOTE
Anemia is likely due to Iron deficiency. Most recent hemoglobin and hematocrit are listed below.  Recent Labs     06/29/25  0557 06/30/25  0447 07/01/25  0448   HGB 11.5* 11.0* 11.2*   HCT 35.2* 34.6* 34.3*     Plan  - Monitor serial CBC: Daily  - Transfuse PRBC if patient becomes hemodynamically unstable, symptomatic or H/H drops below 7/21.  - Patient has not received any PRBC transfusions to date  - Patient's anemia is currently stable  -

## 2025-07-01 NOTE — PROGRESS NOTES
Pharmacokinetic Assessment Follow Up: IV Vancomycin    Vancomycin serum concentration assessment(s):    The trough level was drawn correctly and can be used to guide therapy at this time. The measurement is below the desired definitive target range of 10 to 20 mcg/mL.  Level before 3rd dose is not quite steady state so expect true trough to be higher than 8.2 but < 15  Indication is sepsis/cellulitis, patient improving today, may be appropriate to target trough around 15 mcg/mL    Vancomycin Regimen Plan:    Change regimen to Vancomycin 1000 mg IV every 24 hours with next serum trough concentration measured at 2100 prior to 4th dose on 7/4    Drug levels (last 3 results):  Recent Labs   Lab Result Units 06/30/25  2105   Vancomycin Trough ug/ml 8.2*       Pharmacy will continue to follow and monitor vancomycin.    Please contact pharmacy for questions regarding this assessment.    Thank you for the consult,   Riana Desai PharmD       Patient brief summary:  Jennifer Rob is a 84 y.o. female initiated on antimicrobial therapy with IV Vancomycin for treatment of sepsis/cellulitis      Drug Allergies:   Review of patient's allergies indicates:   Allergen Reactions    Sulfa (sulfonamide antibiotics) Rash     unknown reaction.   has been years       Actual Body Weight:   51.4 kg    Renal Function:   Estimated Creatinine Clearance: 30.9 mL/min (based on SCr of 1.08 mg/dL).    Dialysis Method (if applicable):  N/A    CBC (last 72 hours):  Recent Labs   Lab Result Units 06/28/25 2020 06/29/25 0557 06/30/25 0447   WBC x10(3)/mcL 20.84* 16.05* 8.94   Hgb g/dL 11.8 11.5* 11.0*   Hct % 35.5* 35.2* 34.6*   Platelet x10(3)/mcL 176 146 144   Mono % % 8.0 7.0 8.3   Eos % % 0.0* 0.2* 1.2   Basophil % % 0.2 0.2 0.4       Metabolic Panel (last 72 hours):  Recent Labs   Lab Result Units 06/28/25 2020 06/28/25 2046 06/29/25 0557 06/30/25  0447   Sodium mmol/L 131*  --  139 143   Potassium mmol/L 4.4  --  4.2 4.2   Chloride  mmol/L 98  --  108 114*   CO2 mmol/L 27  --  28 28   Glucose mg/dL 158*  --  97 83   Glucose, UA   --  Negative  --   --    Blood Urea Nitrogen mg/dL 25*  --  17 15   Creatinine mg/dL 1.09  --  1.12 1.08   Albumin g/dL 4.2  --  3.2* 2.8*   Bilirubin Total mg/dL 0.6  --  0.4 0.3   ALP unit/L 62  --  56 53   AST unit/L 27  --  32 35   ALT unit/L 13  --  12 13   Magnesium Level mg/dL 1.70*  --   --  2.10   Phosphorus Level mg/dL 2.3*  --   --   --        Vancomycin Administrations:  vancomycin given in the last 96 hours                     vancomycin 750 mg in 0.9% NaCl 250 mL IVPB (admixture device) (mg) 750 mg New Bag 06/30/25 2240     750 mg New Bag 06/29/25 2131    vancomycin (VANCOCIN) 1,000 mg in 0.9% NaCl 250 mL IVPB (admixture device) ()  Restarted 06/28/25 2214      Restarted  2140     1,000 mg New Bag  2134                    Microbiologic Results:  Microbiology Results (last 7 days)       Procedure Component Value Units Date/Time    Blood Culture x two cultures. Draw prior to antibiotics [[id number]] Collected: 06/28/25 2020    Order Status: Completed Specimen: Blood Updated: 06/30/25 2201     Blood Culture No Growth At 48 Hours    Blood Culture x two cultures. Draw prior to antibiotics [[id number]] Collected: 06/28/25 2033    Order Status: Completed Specimen: Blood Updated: 06/30/25 2201     Blood Culture No Growth At 48 Hours

## 2025-07-01 NOTE — PLAN OF CARE
[facility] list of home health companies in the area presented and discussed with patient and daughter.  Patient's daughter had initially stated that they would like to have Darwin Galvan MD at discharge since they have use them in the past.  After discussion of the choices, they chose to use [facility].  Referral sent to [facility] for review.

## 2025-07-01 NOTE — PROGRESS NOTES
[facility]-Med/Surg  Wound Care    Patient Name:  Jennifer Rob   MRN:  [id number]  Date: 7/1/2025  Diagnosis: Severe sepsis    History:     Past Medical History:   Diagnosis Date    Bipolar disorder, unspecified     Hypertension        Social History[1]    Precautions:     Allergies as of 06/28/2025 - Reviewed 06/28/2025   Allergen Reaction Noted    Sulfa (sulfonamide antibiotics) Rash 02/15/2023       WOC Assessment Details/Treatment        07/01/25 1433   WOCN Assessment   WOCN Total Time (mins) 20   Visit Date 07/01/25   Visit Time 1420   Consult Type New   WOCN Speciality Wound   Wound skin tear;other  (cellulitis)   Intervention assessed;changed   Pressure Injury Prevention    Heel protection technique Pillow   Heel preventative measures Peel back dressing/boot, assess skin and reapply        Wound 06/28/25 1945 Other (comment) Left lower Leg   Date First Assessed/Time First Assessed: 06/28/25 1945   Present on Original Admission: Yes  Primary Wound Type: (c) Other (comment)  Side: Left  Orientation: lower  Location: Leg   Wound Image      Dressing Appearance Open to air   Drainage Amount None   Drainage Characteristics/Odor No odor   Appearance Intact;Red   Periwound Area Redness;Edematous   Dressing   (open to air. No wound care interventions at this time.)        Wound 06/28/25 2250 Skin Tear Right Elbow #2   Date First Assessed/Time First Assessed: 06/28/25 2250   Present on Original Admission: Yes  Primary Wound Type: Skin Tear  Side: Right  Location: Elbow  Wound Number: #2  Is this injury device related?: No   Wound Image    Dressing Appearance Dry;Intact;Clean   Drainage Amount Scant   Drainage Characteristics/Odor Sanguineous;No odor   Appearance Red;Moist  (mostly well approximated except where edges met; slight gap)   Red (%), Wound Tissue Color 100 %   Periwound Area Ecchymotic   Wound Edges Irregular;Jagged   Wound Length (cm) 3 cm   Wound Width (cm) 1 cm   Wound Depth (cm) 0.1 cm    Wound Volume (cm^3) 0.157 cm^3   Wound Surface Area (cm^2) 2.36 cm^2   Care Cleansed with:;Sterile normal saline   Dressing Applied;Foam   Dressing Change Due 07/08/25     Orders placed.  Skin tear to right elbow, foam treatment weekly.  Cellulitis intact to left lower leg.  Elevated on pillow. IV abx.      07/01/2025         [1]   Social History  Socioeconomic History    Marital status:    Tobacco Use    Smoking status: Never    Smokeless tobacco: Never   Vaping Use    Vaping status: Never Used   Substance and Sexual Activity    Alcohol use: Not Currently    Drug use: Never   Social History Narrative    ** Merged History Encounter **          Social Drivers of Health     Financial Resource Strain: Low Risk  (6/28/2025)    Overall Financial Resource Strain (CARDIA)     Difficulty of Paying Living Expenses: Not hard at all   Food Insecurity: No Food Insecurity (6/28/2025)    Hunger Vital Sign     Worried About Running Out of Food in the Last Year: Never true     Ran Out of Food in the Last Year: Never true   Transportation Needs: No Transportation Needs (6/28/2025)    PRAPARE - Transportation     Lack of Transportation (Medical): No     Lack of Transportation (Non-Medical): No   Physical Activity: Insufficiently Active (6/30/2025)    Exercise Vital Sign     Days of Exercise per Week: 3 days     Minutes of Exercise per Session: 20 min   Stress: Stress Concern Present (6/28/2025)    [employer] - Occupational Stress Questionnaire     Feeling of Stress : Very much   Housing Stability: Low Risk  (6/28/2025)    Housing Stability Vital Sign     Unable to Pay for Housing in the Last Year: No     Number of Times Moved in the Last Year: 0     Homeless in the Last Year: No

## 2025-07-02 LAB
ANION GAP SERPL CALC-SCNC: 4 MEQ/L (ref 2–13)
BASOPHILS # BLD AUTO: 0.05 X10(3)/MCL (ref 0.01–0.08)
BASOPHILS NFR BLD AUTO: 0.5 % (ref 0.1–1.2)
BUN SERPL-MCNC: 8 MG/DL (ref 7–20)
CALCIUM SERPL-MCNC: 8.1 MG/DL (ref 8.4–10.2)
CHLORIDE SERPL-SCNC: 112 MMOL/L (ref 98–110)
CO2 SERPL-SCNC: 27 MMOL/L (ref 21–32)
CREAT SERPL-MCNC: 0.88 MG/DL (ref 0.66–1.25)
CREAT/UREA NIT SERPL: 9 (ref 12–20)
EOSINOPHIL # BLD AUTO: 0.17 X10(3)/MCL (ref 0.04–0.36)
EOSINOPHIL NFR BLD AUTO: 1.7 % (ref 0.7–7)
ERYTHROCYTE [DISTWIDTH] IN BLOOD BY AUTOMATED COUNT: 12.9 % (ref 11–14.5)
GFR SERPLBLD CREATININE-BSD FMLA CKD-EPI: 64 ML/MIN/1.73/M2
GLUCOSE SERPL-MCNC: 105 MG/DL (ref 70–115)
HCT VFR BLD AUTO: 31.8 % (ref 36–48)
HGB BLD-MCNC: 10.5 G/DL (ref 11.8–16)
IMM GRANULOCYTES # BLD AUTO: 0.04 X10(3)/MCL (ref 0–0.03)
IMM GRANULOCYTES NFR BLD AUTO: 0.4 % (ref 0–0.5)
LYMPHOCYTES # BLD AUTO: 1.53 X10(3)/MCL (ref 1.16–3.74)
LYMPHOCYTES NFR BLD AUTO: 15.4 % (ref 20–55)
MCH RBC QN AUTO: 32.2 PG (ref 27–34)
MCHC RBC AUTO-ENTMCNC: 33 G/DL (ref 31–37)
MCV RBC AUTO: 97.5 FL (ref 79–99)
MONOCYTES # BLD AUTO: 0.75 X10(3)/MCL (ref 0.24–0.36)
MONOCYTES NFR BLD AUTO: 7.6 % (ref 4.7–12.5)
NEUTROPHILS # BLD AUTO: 7.38 X10(3)/MCL (ref 1.56–6.13)
NEUTROPHILS NFR BLD AUTO: 74.4 % (ref 37–73)
NRBC BLD AUTO-RTO: 0 %
PLATELET # BLD AUTO: 113 X10(3)/MCL (ref 140–371)
PMV BLD AUTO: 10.8 FL (ref 9.4–12.4)
POTASSIUM SERPL-SCNC: 3.6 MMOL/L (ref 3.5–5.1)
RBC # BLD AUTO: 3.26 X10(6)/MCL (ref 4–5.1)
SODIUM SERPL-SCNC: 143 MMOL/L (ref 136–145)
VANCOMYCIN TROUGH SERPL-MCNC: 16.8 UG/ML (ref 10–20)
WBC # BLD AUTO: 9.92 X10(3)/MCL (ref 4–11.5)

## 2025-07-02 PROCEDURE — 63600175 PHARM REV CODE 636 W HCPCS: Performed by: FAMILY MEDICINE

## 2025-07-02 PROCEDURE — 63600175 PHARM REV CODE 636 W HCPCS: Performed by: INTERNAL MEDICINE

## 2025-07-02 PROCEDURE — 80202 ASSAY OF VANCOMYCIN: CPT | Performed by: FAMILY MEDICINE

## 2025-07-02 PROCEDURE — 36415 COLL VENOUS BLD VENIPUNCTURE: CPT | Performed by: FAMILY MEDICINE

## 2025-07-02 PROCEDURE — 25000003 PHARM REV CODE 250: Performed by: INTERNAL MEDICINE

## 2025-07-02 PROCEDURE — 80048 BASIC METABOLIC PNL TOTAL CA: CPT | Performed by: FAMILY MEDICINE

## 2025-07-02 PROCEDURE — 94761 N-INVAS EAR/PLS OXIMETRY MLT: CPT

## 2025-07-02 PROCEDURE — 21400001 HC TELEMETRY ROOM

## 2025-07-02 PROCEDURE — 97530 THERAPEUTIC ACTIVITIES: CPT

## 2025-07-02 PROCEDURE — 85025 COMPLETE CBC W/AUTO DIFF WBC: CPT | Performed by: FAMILY MEDICINE

## 2025-07-02 PROCEDURE — 25000003 PHARM REV CODE 250: Performed by: FAMILY MEDICINE

## 2025-07-02 RX ORDER — CEFTRIAXONE 1 G/1
1 INJECTION, POWDER, FOR SOLUTION INTRAMUSCULAR; INTRAVENOUS
Status: DISCONTINUED | OUTPATIENT
Start: 2025-07-02 | End: 2025-07-05 | Stop reason: HOSPADM

## 2025-07-02 RX ADMIN — SODIUM CHLORIDE 1000 MG: 9 INJECTION, SOLUTION INTRAVENOUS at 04:07

## 2025-07-02 RX ADMIN — DEXTROSE MONOHYDRATE: 5 INJECTION, SOLUTION INTRAVENOUS at 04:07

## 2025-07-02 RX ADMIN — ASPIRIN 81 MG: 81 TABLET ORAL at 09:07

## 2025-07-02 RX ADMIN — CARBIDOPA AND LEVODOPA 1 TABLET: 10; 100 TABLET ORAL at 09:07

## 2025-07-02 RX ADMIN — SODIUM CHLORIDE: 9 INJECTION, SOLUTION INTRAVENOUS at 04:07

## 2025-07-02 RX ADMIN — CARBIDOPA AND LEVODOPA 1 TABLET: 10; 100 TABLET ORAL at 06:07

## 2025-07-02 RX ADMIN — METOPROLOL SUCCINATE 25 MG: 25 TABLET, EXTENDED RELEASE ORAL at 09:07

## 2025-07-02 RX ADMIN — VENLAFAXINE HYDROCHLORIDE 75 MG: 75 CAPSULE, EXTENDED RELEASE ORAL at 09:07

## 2025-07-02 RX ADMIN — LORAZEPAM 0.5 MG: 0.5 TABLET ORAL at 04:07

## 2025-07-02 RX ADMIN — AMLODIPINE BESYLATE 2.5 MG: 2.5 TABLET ORAL at 09:07

## 2025-07-02 RX ADMIN — DIVALPROEX SODIUM 500 MG: 250 TABLET, DELAYED RELEASE ORAL at 09:07

## 2025-07-02 RX ADMIN — CARBIDOPA AND LEVODOPA 1 TABLET: 25; 100 TABLET ORAL at 06:07

## 2025-07-02 RX ADMIN — CEFTRIAXONE SODIUM 1 G: 1 INJECTION, POWDER, FOR SOLUTION INTRAMUSCULAR; INTRAVENOUS at 11:07

## 2025-07-02 RX ADMIN — LOSARTAN POTASSIUM 50 MG: 50 TABLET, FILM COATED ORAL at 09:07

## 2025-07-02 RX ADMIN — PIPERACILLIN AND TAZOBACTAM 4.5 G: 4; .5 INJECTION, POWDER, FOR SOLUTION INTRAVENOUS; PARENTERAL at 04:07

## 2025-07-02 RX ADMIN — SODIUM CHLORIDE: 9 INJECTION, SOLUTION INTRAVENOUS at 08:07

## 2025-07-02 RX ADMIN — ENOXAPARIN SODIUM 40 MG: 40 INJECTION SUBCUTANEOUS at 06:07

## 2025-07-02 RX ADMIN — SODIUM CHLORIDE: 9 INJECTION, SOLUTION INTRAVENOUS at 09:07

## 2025-07-02 RX ADMIN — FERROUS SULFATE TAB 325 MG (65 MG ELEMENTAL FE) 1 EACH: 325 (65 FE) TAB at 09:07

## 2025-07-02 RX ADMIN — CARBIDOPA AND LEVODOPA 1 TABLET: 25; 100 TABLET ORAL at 09:07

## 2025-07-02 RX ADMIN — CARBIDOPA AND LEVODOPA 1 TABLET: 25; 100 TABLET ORAL at 02:07

## 2025-07-02 RX ADMIN — HYDRALAZINE HYDROCHLORIDE 10 MG: 20 INJECTION INTRAMUSCULAR; INTRAVENOUS at 05:07

## 2025-07-02 RX ADMIN — CARBIDOPA AND LEVODOPA 1 TABLET: 10; 100 TABLET ORAL at 02:07

## 2025-07-02 NOTE — ASSESSMENT & PLAN NOTE
Hyponatremia is likely due to Dehydration/hypovolemia. The patient's most recent sodium results are listed below.  Recent Labs     06/30/25  0447 07/01/25  0448 07/02/25  0448    142 143     Plan  - Correct the sodium by 4-6mEq in 24 hours.   - Obtain the following studies: rsg.  - Will treat the hyponatremia with IV fluids as follows: ns  - Monitor sodium Daily.   - Patient hyponatremia is stable  -

## 2025-07-02 NOTE — PLAN OF CARE
07/02/25 1202   Discharge Reassessment   Assessment Type Discharge Planning Reassessment   Did the patient's condition or plan change since previous assessment? No   Discharge Plan discussed with: Patient;Adult children   Communicated MARTI with patient/caregiver Yes   Discharge Plan A Home Health   Discharge Plan B Rehab   DME Needed Upon Discharge  none   Transition of Care Barriers Mobility   Why the patient remains in the hospital Requires continued medical care   Post-Acute Status   Post-Acute Authorization Home Health   Home Health Status Set-up Complete/Auth obtained   Hospital Resources/Appts/Education Provided Post-Acute resouces added to AVS   Patient choice form signed by patient/caregiver List with quality metrics by geographic area provided;List from CMS Compare;List from System Post-Acute Care   Discharge Delays None known at this time

## 2025-07-02 NOTE — PROGRESS NOTES
Pharmacokinetic Assessment Follow Up: IV Vancomycin    Vancomycin serum concentration assessment(s):    The trough level was drawn correctly and can be used to guide therapy at this time. The measurement is within the desired definitive target range of 15 to 20 mcg/mL.    Vancomycin Regimen Plan:    Continue regimen to Vancomycin 1000 mg IV every 12 hours with next serum trough concentration measured at 1500 prior to 5th dose on 07/04/2025    Drug levels (last 3 results):  Recent Labs   Lab Result Units 06/30/25  2105 07/01/25  1300 07/02/25  1504   Vancomycin Trough ug/ml 8.2* 8.7* 16.8       Pharmacy will continue to follow and monitor vancomycin.    Please contact pharmacy at [phone] for questions regarding this assessment.    Thank you for the consult,   Jacque Hurd, LTAC, located within [facility].  [phone]     Patient brief summary:  Jennifer Rob is a 84 y.o. female initiated on antimicrobial therapy with IV Vancomycin for treatment of sepsis      Drug Allergies:   Review of patient's allergies indicates:   Allergen Reactions    Sulfa (sulfonamide antibiotics) Rash     unknown reaction.   has been years       Renal Function:   Estimated Creatinine Clearance: 37.9 mL/min (based on SCr of 0.88 mg/dL).,       CBC (last 72 hours):  Recent Labs   Lab Result Units 06/30/25  0447 07/01/25  0448 07/02/25  0448   WBC x10(3)/mcL 8.94 9.57 9.92   Hgb g/dL 11.0* 11.2* 10.5*   Hct % 34.6* 34.3* 31.8*   Platelet x10(3)/mcL 144 116* 113*   Mono % % 8.3 7.6 7.6   Eos % % 1.2 1.8 1.7   Basophil % % 0.4 0.4 0.5       Metabolic Panel (last 72 hours):  Recent Labs   Lab Result Units 06/30/25  0447 07/01/25  0448 07/02/25  0448   Sodium mmol/L 143 142 143   Potassium mmol/L 4.2 3.7 3.6   Chloride mmol/L 114* 111* 112*   CO2 mmol/L 28 29 27   Glucose mg/dL 83 88 105   Blood Urea Nitrogen mg/dL 15 15 8   Creatinine mg/dL 1.08 1.02 0.88   Albumin g/dL 2.8* 2.9*  --    Bilirubin Total mg/dL 0.3 0.3  --    ALP unit/L 53 56  --    AST unit/L 35  31  --    ALT unit/L 13 10  --    Magnesium Level mg/dL 2.10  --   --        Microbiologic Results:  Microbiology Results (last 7 days)       Procedure Component Value Units Date/Time    Blood Culture x two cultures. Draw prior to antibiotics [[id number]] Collected: 06/28/25 2020    Order Status: Completed Specimen: Blood Updated: 07/01/25 2201     Blood Culture No Growth At 72 Hours    Blood Culture x two cultures. Draw prior to antibiotics [[id number]] Collected: 06/28/25 2033    Order Status: Completed Specimen: Blood Updated: 07/01/25 2201     Blood Culture No Growth At 72 Hours

## 2025-07-02 NOTE — PLAN OF CARE
07/02/25 1052   Medicare Message   Important Message from Medicare regarding Discharge Appeal Rights Given to patient/caregiver;Explained to patient/caregiver;Signed/date by patient/caregiver;Other (comments)  (copy given)   Date IMM was signed 07/02/25   Time IMM was signed 0930

## 2025-07-02 NOTE — PT/OT/SLP PROGRESS
Physical Therapy Treatment    Patient Name:  Jennifer Rob   MRN:  [id number]    Recommendations:     Discharge Recommendations: Low Intensity Therapy  Discharge Equipment Recommendations: none  Barriers to discharge: current medical status    Assessment:     Jennifer Rob is a 84 y.o. female admitted with a medical diagnosis of Severe sepsis.  She presents with the following impairments/functional limitations: weakness, impaired endurance, impaired self care skills, impaired functional mobility, gait instability, impaired balance, impaired cognition, decreased lower extremity function, decreased safety awareness     Patient found with HOB elevated. Agreeable to therapy today. Patient tolerated supine to sit with min A. Patient then tolerated functional sit to stand with min A using RW. Upon standing, patient found to have large liquidy BM. Patient transferred to [facility] to finish having a BM. Patient was given laxatives yesterday and now is having multiple BMs. Patient stood from [facility] to be cleaned and then transferred to recliner, requesting not to walk at this time. Patient positioned in recliner for comfort. Patient left up with chair alarm on and family present.     Rehab Prognosis: Good; patient would benefit from acute skilled PT services to address these deficits and reach maximum level of function.    Recent Surgery: * No surgery found *      Plan:     During this hospitalization, patient to be seen 5 x/week (5-6x weekly/1-2x daily) to address the identified rehab impairments via gait training, therapeutic exercises, therapeutic activities and progress toward the following goals:    Plan of Care Expires:  07/30/25    Subjective     Chief Complaint: tired  Patient/Family Comments/goals: to go home  Pain/Comfort:         Objective:     Communicated with nursing prior to session.  Patient found HOB elevated with peripheral IV, PureWick upon PT entry to room.     General Precautions: Standard, fall  Orthopedic  Precautions: N/A  Braces: N/A  Respiratory Status: Room air     Functional Mobility:  Bed Mobility:     Supine to Sit: minimum assistance  Transfers:     Sit to Stand:  minimum assistance with rolling walker  Bed to Chair: minimum assistance with  rolling walker  using  Step Transfer      AM-PAC 6 CLICK MOBILITY          Treatment & Education:  See above    Patient left up in chair with all lines intact, call button in reach, chair alarm on, nurse notified, and family present..    GOALS:   Multidisciplinary Problems       Physical Therapy Goals          Problem: Physical Therapy    Goal Priority Disciplines Outcome Interventions   Physical Therapy Goal     PT, PT/OT Progressing    Description: Goals to be met by: discharge     Patient will increase functional independence with mobility by performin. Supine to sit with Stand-by Assistance  2. Sit to stand transfer with Stand-by Assistance  3. Gait  x 50 feet with Stand-by Assistance using Rolling Walker.                          DME Justifications:  No DME recommended requiring DME justifications    Time Tracking:     PT Received On: 25  PT Start Time: 1005     PT Stop Time: 1030  PT Total Time (min): 25 min     Billable Minutes: Therapeutic Activity 25    Treatment Type: Treatment  PT/PTA: PT           2025

## 2025-07-02 NOTE — ASSESSMENT & PLAN NOTE
The likely etiology of thrombocytopenia is infection. The patients 3 most recent labs are listed below.  Recent Labs     06/30/25  0447 07/01/25  0448 07/02/25  0448    116* 113*     Plan  - Will transfuse if platelet count is <50k (if undergoing surgical procedure or have active bleeding).  -

## 2025-07-02 NOTE — PROGRESS NOTES
[facility]/Surg  [facility] Medicine  PROGRESS NOTE    Patient Name: Jennifer Rob  MRN: [id number]  Patient Class: IP- Inpatient  Admission Date: 6/28/2025  Attending Physician: Riana Leal MD   Primary Care Provider: Zak Esteves MD         Patient information was obtained from patient and ER records.     Subjective:     Principal Problem:Severe sepsis    Chief Complaint:   Chief Complaint   Patient presents with    Fever    Altered Mental Status    Fall     Family reports pt fell today, unwitnessed, wound to right elbow, pt reports she hit her head, also c/o LLE redness, swelling, warmth. Hx: parkinson's.        HPI: 84-year-old F patient with PMH of Parkinson's disease and bipolar disorder presents to the ER with a fall at home after she tripped on the floor. Family found her on the floor. Patient denies having any loss of consciousness, seizure activity, chest pain shortness of breath or abdominal pain. Family report swelling and erythema on the LLE that started in the calf and then extended circumferentially. Patient on arrival has a temperature of 102.4° F. Labs showed WBC of 20.84, phosphorus of 2.3, Mg 1.7, BNP 2670 and lactate 2.5. She was started on IV antibiotics and admitted for further management.     06/29/2025 pt admitted with fecer and sepsis thought to be due to RLE cellulitis, h/ o Parkinson's disease daughter noted limping on right leg x 1-2 days prior to fever.  06/30/2025 more alert today, left LE redness is less  07/01/2025 redness to LLE a bit more and a bit more swollen, WBC stable  Ovrall feeling better  07/02/2025 redness left LE, some bruising not any better last w 2 days, looked a little better initially after abx started, still warm and red. US negative for DVT    Review of Systems   Unable to perform ROS: Mental status change     Vitals:    07/02/25 1105   BP: (!) 147/62   Pulse: 79   Resp: 20   Temp: 98.7 °F (37.1 °C)     Physical Exam  Vitals and  nursing note reviewed. Exam conducted with a chaperone present.   Constitutional:       General: She is not in acute distress.     Appearance: Normal appearance. She is normal weight. She is not ill-appearing.   HENT:      Head: Atraumatic.   Cardiovascular:      Rate and Rhythm: Normal rate and regular rhythm.      Pulses: Normal pulses.      Heart sounds: Normal heart sounds.   Pulmonary:      Effort: Pulmonary effort is normal.      Breath sounds: Normal breath sounds.   Abdominal:      General: Abdomen is flat. Bowel sounds are normal.      Palpations: Abdomen is soft.   Musculoskeletal:      Right lower leg: No edema.      Left lower leg: No edema.   Skin:     General: Skin is warm and dry.      Findings: Erythema (less today) and rash present. No lesion.      Comments: Left LE redness, dry skin areas no floculence or lesion  LLE slightly increased edema and redness today   Neurological:      General: No focal deficit present.      Mental Status: She is alert. Mental status is at baseline.   Psychiatric:         Mood and Affect: Mood normal.         Behavior: Behavior normal.           Assessment/Plan:     Assessment & Plan  Severe sepsis  - 2/2 cellulitis  - lactate 2.5  - started on IV antibiotics and will continue  Imrpoving slowly  D/c zosyn and change to Rocephin ? Strep infection  Parkinson's disease  - home meds  Bipolar disorder, unspecified  - home meds  Cellulitis  - IV vancomycin a  Change to rocephin  Redness about the same  US neg for DVT  Hypophosphatemia  - replete  Hypomagnesemia  Patient with noted electrolyte deficiencies with Hypo-Magnesemia. Latest electrolytes have been reviewed and values are   Magnesium Level   Date Value Ref Range Status   06/30/2025 2.10 1.80 - 2.40 mg/dL Final   . Will continue to monitor electrolytes closely. Will replace the affected electrolytes and repeat labs to be done after interventions completed.    improved  Anemia  Anemia is likely due to Iron deficiency.  Most recent hemoglobin and hematocrit are listed below.  Recent Labs     06/30/25 0447 07/01/25 0448 07/02/25 0448   HGB 11.0* 11.2* 10.5*   HCT 34.6* 34.3* 31.8*     Plan  - Monitor serial CBC: Daily  - Transfuse PRBC if patient becomes hemodynamically unstable, symptomatic or H/H drops below 7/21.  - Patient has not received any PRBC transfusions to date  - Patient's anemia is currently stable  -    Hyponatremia  Hyponatremia is likely due to Dehydration/hypovolemia. The patient's most recent sodium results are listed below.  Recent Labs     06/30/25 0447 07/01/25 0448 07/02/25 0448    142 143     Plan  - Correct the sodium by 4-6mEq in 24 hours.   - Obtain the following studies: rsg.  - Will treat the hyponatremia with IV fluids as follows: ns  - Monitor sodium Daily.   - Patient hyponatremia is stable  -    Thrombocytopenia  The likely etiology of thrombocytopenia is infection. The patients 3 most recent labs are listed below.  Recent Labs     06/30/25 0447 07/01/25 0448 07/02/25 0448    116* 113*     Plan  - Will transfuse if platelet count is <50k (if undergoing surgical procedure or have active bleeding).  -      Primary hypertension  Patient's blood pressure range in the last 24 hours was: BP  Min: 118/55  Max: 168/67.The patient's inpatient anti-hypertensive regimen is listed below:  Current Antihypertensives  metoprolol succinate (TOPROL-XL) 24 hr tablet 25 mg, Daily, Oral  losartan tablet 50 mg, Daily, Oral  amLODIPine tablet 2.5 mg, Nightly, Oral  cloNIDine tablet 0.2 mg, Every 6 hours PRN, Oral  hydrALAZINE injection 10 mg, Every 4 hours PRN, Intravenous    Plan  - BP is controlled, no changes needed to their regimen  -  contiue current meds    VTE Risk Mitigation (From admission, onward)           Ordered     enoxaparin injection 40 mg  Daily         06/28/25 2208     IP VTE HIGH RISK PATIENT  Once         06/28/25 2208                                  Riana Leal  MD  Department of Hospital Medicine  [facility]-Med/Surg

## 2025-07-02 NOTE — ASSESSMENT & PLAN NOTE
- 2/2 cellulitis  - lactate 2.5  - started on IV antibiotics and will continue  Imrpoving slowly  D/c zosyn and change to Rocephin ? Strep infection

## 2025-07-02 NOTE — ASSESSMENT & PLAN NOTE
Anemia is likely due to Iron deficiency. Most recent hemoglobin and hematocrit are listed below.  Recent Labs     06/30/25  0447 07/01/25  0448 07/02/25  0448   HGB 11.0* 11.2* 10.5*   HCT 34.6* 34.3* 31.8*     Plan  - Monitor serial CBC: Daily  - Transfuse PRBC if patient becomes hemodynamically unstable, symptomatic or H/H drops below 7/21.  - Patient has not received any PRBC transfusions to date  - Patient's anemia is currently stable  -

## 2025-07-02 NOTE — ASSESSMENT & PLAN NOTE
Patient's blood pressure range in the last 24 hours was: BP  Min: 118/55  Max: 168/67.The patient's inpatient anti-hypertensive regimen is listed below:  Current Antihypertensives  metoprolol succinate (TOPROL-XL) 24 hr tablet 25 mg, Daily, Oral  losartan tablet 50 mg, Daily, Oral  amLODIPine tablet 2.5 mg, Nightly, Oral  cloNIDine tablet 0.2 mg, Every 6 hours PRN, Oral  hydrALAZINE injection 10 mg, Every 4 hours PRN, Intravenous    Plan  - BP is controlled, no changes needed to their regimen  -  contiue current meds

## 2025-07-03 LAB
ANION GAP SERPL CALC-SCNC: 2 MEQ/L (ref 2–13)
BACTERIA BLD CULT: NORMAL
BACTERIA BLD CULT: NORMAL
BASOPHILS # BLD AUTO: 0.05 X10(3)/MCL (ref 0.01–0.08)
BASOPHILS NFR BLD AUTO: 0.6 % (ref 0.1–1.2)
BUN SERPL-MCNC: 9 MG/DL (ref 7–20)
CALCIUM SERPL-MCNC: 7.9 MG/DL (ref 8.4–10.2)
CHLORIDE SERPL-SCNC: 116 MMOL/L (ref 98–110)
CO2 SERPL-SCNC: 26 MMOL/L (ref 21–32)
CREAT SERPL-MCNC: 1.11 MG/DL (ref 0.66–1.25)
CREAT/UREA NIT SERPL: 8 (ref 12–20)
EOSINOPHIL # BLD AUTO: 0.16 X10(3)/MCL (ref 0.04–0.36)
EOSINOPHIL NFR BLD AUTO: 1.9 % (ref 0.7–7)
ERYTHROCYTE [DISTWIDTH] IN BLOOD BY AUTOMATED COUNT: 13.2 % (ref 11–14.5)
GFR SERPLBLD CREATININE-BSD FMLA CKD-EPI: 49 ML/MIN/1.73/M2
GLUCOSE SERPL-MCNC: 91 MG/DL (ref 70–115)
HCT VFR BLD AUTO: 30.7 % (ref 36–48)
HGB BLD-MCNC: 10.2 G/DL (ref 11.8–16)
IMM GRANULOCYTES # BLD AUTO: 0.05 X10(3)/MCL (ref 0–0.03)
IMM GRANULOCYTES NFR BLD AUTO: 0.6 % (ref 0–0.5)
LYMPHOCYTES # BLD AUTO: 2.02 X10(3)/MCL (ref 1.16–3.74)
LYMPHOCYTES NFR BLD AUTO: 23.5 % (ref 20–55)
MCH RBC QN AUTO: 32.2 PG (ref 27–34)
MCHC RBC AUTO-ENTMCNC: 33.2 G/DL (ref 31–37)
MCV RBC AUTO: 96.8 FL (ref 79–99)
MONOCYTES # BLD AUTO: 0.83 X10(3)/MCL (ref 0.24–0.36)
MONOCYTES NFR BLD AUTO: 9.7 % (ref 4.7–12.5)
NEUTROPHILS # BLD AUTO: 5.47 X10(3)/MCL (ref 1.56–6.13)
NEUTROPHILS NFR BLD AUTO: 63.7 % (ref 37–73)
NRBC BLD AUTO-RTO: 0 %
PLATELET # BLD AUTO: 144 X10(3)/MCL (ref 140–371)
PMV BLD AUTO: 10.7 FL (ref 9.4–12.4)
POTASSIUM SERPL-SCNC: 3.5 MMOL/L (ref 3.5–5.1)
RBC # BLD AUTO: 3.17 X10(6)/MCL (ref 4–5.1)
SODIUM SERPL-SCNC: 144 MMOL/L (ref 136–145)
WBC # BLD AUTO: 8.58 X10(3)/MCL (ref 4–11.5)

## 2025-07-03 PROCEDURE — 63600175 PHARM REV CODE 636 W HCPCS: Performed by: INTERNAL MEDICINE

## 2025-07-03 PROCEDURE — 25000003 PHARM REV CODE 250: Performed by: INTERNAL MEDICINE

## 2025-07-03 PROCEDURE — 21400001 HC TELEMETRY ROOM

## 2025-07-03 PROCEDURE — 36415 COLL VENOUS BLD VENIPUNCTURE: CPT | Performed by: FAMILY MEDICINE

## 2025-07-03 PROCEDURE — 97116 GAIT TRAINING THERAPY: CPT

## 2025-07-03 PROCEDURE — 80048 BASIC METABOLIC PNL TOTAL CA: CPT | Performed by: FAMILY MEDICINE

## 2025-07-03 PROCEDURE — 25500020 PHARM REV CODE 255: Performed by: FAMILY MEDICINE

## 2025-07-03 PROCEDURE — 94761 N-INVAS EAR/PLS OXIMETRY MLT: CPT

## 2025-07-03 PROCEDURE — 25000003 PHARM REV CODE 250: Performed by: FAMILY MEDICINE

## 2025-07-03 PROCEDURE — 63600175 PHARM REV CODE 636 W HCPCS: Performed by: FAMILY MEDICINE

## 2025-07-03 PROCEDURE — 85025 COMPLETE CBC W/AUTO DIFF WBC: CPT | Performed by: FAMILY MEDICINE

## 2025-07-03 RX ORDER — FUROSEMIDE 10 MG/ML
40 INJECTION INTRAMUSCULAR; INTRAVENOUS ONCE
Status: COMPLETED | OUTPATIENT
Start: 2025-07-03 | End: 2025-07-03

## 2025-07-03 RX ADMIN — CARBIDOPA AND LEVODOPA 1 TABLET: 10; 100 TABLET ORAL at 05:07

## 2025-07-03 RX ADMIN — CARBIDOPA AND LEVODOPA 1 TABLET: 25; 100 TABLET ORAL at 05:07

## 2025-07-03 RX ADMIN — FERROUS SULFATE TAB 325 MG (65 MG ELEMENTAL FE) 1 EACH: 325 (65 FE) TAB at 09:07

## 2025-07-03 RX ADMIN — ENOXAPARIN SODIUM 40 MG: 40 INJECTION SUBCUTANEOUS at 05:07

## 2025-07-03 RX ADMIN — CARBIDOPA AND LEVODOPA 1 TABLET: 10; 100 TABLET ORAL at 01:07

## 2025-07-03 RX ADMIN — CARBIDOPA AND LEVODOPA 1 TABLET: 10; 100 TABLET ORAL at 09:07

## 2025-07-03 RX ADMIN — CEFTRIAXONE SODIUM 1 G: 1 INJECTION, POWDER, FOR SOLUTION INTRAMUSCULAR; INTRAVENOUS at 10:07

## 2025-07-03 RX ADMIN — SODIUM CHLORIDE: 9 INJECTION, SOLUTION INTRAVENOUS at 06:07

## 2025-07-03 RX ADMIN — METOPROLOL SUCCINATE 25 MG: 25 TABLET, EXTENDED RELEASE ORAL at 09:07

## 2025-07-03 RX ADMIN — AMLODIPINE BESYLATE 2.5 MG: 2.5 TABLET ORAL at 08:07

## 2025-07-03 RX ADMIN — DIVALPROEX SODIUM 500 MG: 250 TABLET, DELAYED RELEASE ORAL at 08:07

## 2025-07-03 RX ADMIN — CARBIDOPA AND LEVODOPA 1 TABLET: 25; 100 TABLET ORAL at 09:07

## 2025-07-03 RX ADMIN — CARBIDOPA AND LEVODOPA 1 TABLET: 10; 100 TABLET ORAL at 08:07

## 2025-07-03 RX ADMIN — IOHEXOL 150 ML: 350 INJECTION, SOLUTION INTRAVENOUS at 03:07

## 2025-07-03 RX ADMIN — FUROSEMIDE 40 MG: 10 INJECTION, SOLUTION INTRAMUSCULAR; INTRAVENOUS at 12:07

## 2025-07-03 RX ADMIN — LOSARTAN POTASSIUM 50 MG: 50 TABLET, FILM COATED ORAL at 09:07

## 2025-07-03 RX ADMIN — SODIUM CHLORIDE 1000 MG: 9 INJECTION, SOLUTION INTRAVENOUS at 04:07

## 2025-07-03 RX ADMIN — CARBIDOPA AND LEVODOPA 1 TABLET: 25; 100 TABLET ORAL at 01:07

## 2025-07-03 RX ADMIN — VENLAFAXINE HYDROCHLORIDE 75 MG: 75 CAPSULE, EXTENDED RELEASE ORAL at 09:07

## 2025-07-03 NOTE — ASSESSMENT & PLAN NOTE
Anemia is likely due to Iron deficiency. Most recent hemoglobin and hematocrit are listed below.  Recent Labs     07/01/25  0448 07/02/25  0448 07/03/25  0454   HGB 11.2* 10.5* 10.2*   HCT 34.3* 31.8* 30.7*       Plan  - Monitor serial CBC: Daily  - Transfuse PRBC if patient becomes hemodynamically unstable, symptomatic or H/H drops below 7/21.  - Patient has not received any PRBC transfusions to date  - Patient's anemia is currently stable  -

## 2025-07-03 NOTE — PLAN OF CARE
07/03/25 1510   Discharge Reassessment   Assessment Type Discharge Planning Reassessment   Did the patient's condition or plan change since previous assessment? No   Discharge Plan discussed with: Patient;Adult children   Communicated MARTI with patient/caregiver Yes   Discharge Plan A Home Health   Discharge Plan B Rehab   DME Needed Upon Discharge  none   Transition of Care Barriers Mobility   Why the patient remains in the hospital Requires continued medical care   Post-Acute Status   Post-Acute Authorization Home Health   Home Health Status Set-up Complete/Auth obtained   Hospital Resources/Appts/Education Provided Post-Acute resouces added to AVS   Patient choice form signed by patient/caregiver List with quality metrics by geographic area provided;List from CMS Compare;List from System Post-Acute Care   Discharge Delays None known at this time

## 2025-07-03 NOTE — ASSESSMENT & PLAN NOTE
The likely etiology of thrombocytopenia is infection. The patients 3 most recent labs are listed below.  Recent Labs     07/01/25  0448 07/02/25  0448 07/03/25  0454   * 113* 144       Plan  - Will transfuse if platelet count is <50k (if undergoing surgical procedure or have active bleeding).  -

## 2025-07-03 NOTE — PROGRESS NOTES
[facility]-Med/Surg  Wound Care    Patient Name:  Jennifer Rob   MRN:  [id number]  Date: 7/3/2025  Diagnosis: Severe sepsis    History:     Past Medical History:   Diagnosis Date    Bipolar disorder, unspecified     Hypertension        Social History[1]    Precautions:     Allergies as of 06/28/2025 - Reviewed 06/28/2025   Allergen Reaction Noted    Sulfa (sulfonamide antibiotics) Rash 02/15/2023       WOC Assessment Details/Treatment        07/03/25 1012   WOCN Assessment   WOCN Total Time (mins) 10   Visit Date 07/03/25   Visit Time 0930   Consult Type Follow Up   Wound other  (cellulitis)   Intervention assessed        Wound 06/28/25 1945 Other (comment) Left lower Leg   Date First Assessed/Time First Assessed: 06/28/25 1945   Present on Original Admission: Yes  Primary Wound Type: (c) Other (comment)  Side: Left  Orientation: lower  Location: Leg   Dressing Appearance Open to air   Drainage Amount None   Drainage Characteristics/Odor No odor   Appearance Intact;Red   Periwound Area Edematous;Redness   Dressing   (remains open to air.)   Off Loading   (elevated while up in recliner.  Pillow while in bed.)      No changes to treatment. Continue IV abx.    07/03/2025         [1]   Social History  Socioeconomic History    Marital status:    Tobacco Use    Smoking status: Never    Smokeless tobacco: Never   Vaping Use    Vaping status: Never Used   Substance and Sexual Activity    Alcohol use: Not Currently    Drug use: Never   Social History Narrative    ** Merged History Encounter **          Social Drivers of Health     Financial Resource Strain: Low Risk  (6/28/2025)    Overall Financial Resource Strain (CARDIA)     Difficulty of Paying Living Expenses: Not hard at all   Food Insecurity: No Food Insecurity (6/28/2025)    Hunger Vital Sign     Worried About Running Out of Food in the Last Year: Never true     Ran Out of Food in the Last Year: Never true   Transportation Needs: No  Transportation Needs (6/28/2025)    PRAPARE - Transportation     Lack of Transportation (Medical): No     Lack of Transportation (Non-Medical): No   Physical Activity: Insufficiently Active (6/30/2025)    Exercise Vital Sign     Days of Exercise per Week: 3 days     Minutes of Exercise per Session: 20 min   Stress: Stress Concern Present (6/28/2025)    [facility] - Occupational Stress Questionnaire     Feeling of Stress : Very much   Housing Stability: Low Risk  (6/28/2025)    Housing Stability Vital Sign     Unable to Pay for Housing in the Last Year: No     Number of Times Moved in the Last Year: 0     Homeless in the Last Year: No

## 2025-07-03 NOTE — ASSESSMENT & PLAN NOTE
Hyponatremia is likely due to Dehydration/hypovolemia. The patient's most recent sodium results are listed below.  Recent Labs     07/01/25  0448 07/02/25  0448 07/03/25  0454    143 144       Plan  - Correct the sodium by 4-6mEq in 24 hours.   - Obtain the following studies: rsg.  - Will treat the hyponatremia with IV fluids as follows: ns  - Monitor sodium Daily.   - Patient hyponatremia is stable  -

## 2025-07-03 NOTE — PT/OT/SLP PROGRESS
Physical Therapy Treatment    Patient Name:  Jennifer Rob   MRN:  [id number]    Recommendations:     Discharge Recommendations: Low Intensity Therapy  Discharge Equipment Recommendations: none  Barriers to discharge: current medical status    Assessment:     Jennifer Rob is a 84 y.o. female admitted with a medical diagnosis of Severe sepsis.  She presents with the following impairments/functional limitations: weakness, impaired endurance, impaired self care skills, impaired functional mobility, gait instability, impaired balance, impaired cognition, decreased lower extremity function, decreased safety awareness     Patient found up in chair. Patient tolerated functional sit to stand with min A using RW. Patient able to tolerated 125 feet in hallways with RW with v/cs for walker management. Patient required min/CGA during gait due to some shakiness this morning. Patient returned to room and sat up in recliner. Patient positioned for comfort with legs elevated. Patient left up in chair with all needs in reach, family present.     Rehab Prognosis: Good; patient would benefit from acute skilled PT services to address these deficits and reach maximum level of function.    Recent Surgery: * No surgery found *      Plan:     During this hospitalization, patient to be seen 5 x/week (5-6x weekly/1-2x daily) to address the identified rehab impairments via gait training, therapeutic exercises, therapeutic activities and progress toward the following goals:    Plan of Care Expires:  07/30/25    Subjective     Chief Complaint: none verbalized today  Patient/Family Comments/goals: to go home  Pain/Comfort:         Objective:     Communicated with nursing prior to session.  Patient found up in chair with peripheral IV, PureWick upon PT entry to room.     General Precautions: Standard, fall  Orthopedic Precautions: N/A  Braces: N/A  Respiratory Status: Room air     Functional Mobility:  Bed Mobility:     Supine to Sit:  minimum assistance  Transfers:     Sit to Stand:  minimum assistance with rolling walker  Gait: 125 feet with RW with min/CGA      AM-PAC 6 CLICK MOBILITY          Treatment & Education:  See above    Patient left up in chair with all lines intact, call button in reach, chair alarm on, nurse notified, and family present..    GOALS:   Multidisciplinary Problems       Physical Therapy Goals          Problem: Physical Therapy    Goal Priority Disciplines Outcome Interventions   Physical Therapy Goal     PT, PT/OT Progressing    Description: Goals to be met by: discharge     Patient will increase functional independence with mobility by performin. Supine to sit with Stand-by Assistance  2. Sit to stand transfer with Stand-by Assistance  3. Gait  x 50 feet with Stand-by Assistance using Rolling Walker.                          DME Justifications:  No DME recommended requiring DME justifications    Time Tracking:     PT Received On: 25  PT Start Time: 1004     PT Stop Time: 1019  PT Total Time (min): 15 min     Billable Minutes: Gait Training 15    Treatment Type: Treatment  PT/PTA: PT           2025

## 2025-07-03 NOTE — ASSESSMENT & PLAN NOTE
Patient's blood pressure range in the last 24 hours was: BP  Min: 137/53  Max: 187/80.The patient's inpatient anti-hypertensive regimen is listed below:  Current Antihypertensives  metoprolol succinate (TOPROL-XL) 24 hr tablet 25 mg, Daily, Oral  losartan tablet 50 mg, Daily, Oral  amLODIPine tablet 2.5 mg, Nightly, Oral  cloNIDine tablet 0.2 mg, Every 6 hours PRN, Oral  hydrALAZINE injection 10 mg, Every 4 hours PRN, Intravenous  furosemide injection 40 mg, Once, Intravenous    Plan  - BP is controlled, no changes needed to their regimen  -  contiue current meds

## 2025-07-03 NOTE — PROGRESS NOTES
[facility]/Surg  [facility] Medicine  PROGRESS NOTE    Patient Name: Jennifer Rob  MRN: [id number]  Patient Class: IP- Inpatient  Admission Date: 6/28/2025  Attending Physician: Riana Leal MD   Primary Care Provider: Zak Esteves MD         Patient information was obtained from patient and ER records.     Subjective:     Principal Problem:Severe sepsis    Chief Complaint:   Chief Complaint   Patient presents with    Fever    Altered Mental Status    Fall     Family reports pt fell today, unwitnessed, wound to right elbow, pt reports she hit her head, also c/o LLE redness, swelling, warmth. Hx: parkinson's.        HPI: 84-year-old F patient with PMH of Parkinson's disease and bipolar disorder presents to the ER with a fall at home after she tripped on the floor. Family found her on the floor. Patient denies having any loss of consciousness, seizure activity, chest pain shortness of breath or abdominal pain. Family report swelling and erythema on the LLE that started in the calf and then extended circumferentially. Patient on arrival has a temperature of 102.4° F. Labs showed WBC of 20.84, phosphorus of 2.3, Mg 1.7, BNP 2670 and lactate 2.5. She was started on IV antibiotics and admitted for further management.     06/29/2025 pt admitted with fecer and sepsis thought to be due to RLE cellulitis, h/ o Parkinson's disease daughter noted limping on right leg x 1-2 days prior to fever.  06/30/2025 more alert today, left LE redness is less  07/01/2025 redness to LLE a bit more and a bit more swollen, WBC stable  Ovrall feeling better  07/02/2025 redness left LE, some bruising not any better last w 2 days, looked a little better initially after abx started, still warm and red. US negative for DVT  07/03/2025 PT STILL WITH LLE redness, bruising, some warmth, US LE negative for DVT.    Pt denies pain, area is angry and red.    Review of Systems   Unable to perform ROS: Mental status change      Vitals:    07/03/25 0934   BP: (!) 169/70   Pulse: 76   Resp:    Temp:      Physical Exam  Vitals and nursing note reviewed. Exam conducted with a chaperone present.   Constitutional:       General: She is not in acute distress.     Appearance: Normal appearance. She is normal weight. She is not ill-appearing.   HENT:      Head: Atraumatic.   Cardiovascular:      Rate and Rhythm: Normal rate and regular rhythm.      Pulses: Normal pulses.      Heart sounds: Normal heart sounds.   Pulmonary:      Effort: Pulmonary effort is normal.      Breath sounds: Normal breath sounds.   Abdominal:      General: Abdomen is flat.      Palpations: Abdomen is soft.      Tenderness: There is no abdominal tenderness.   Musculoskeletal:      Right lower leg: No edema.      Left lower leg: No edema.   Skin:     General: Skin is warm and dry.      Findings: Erythema (less today) and rash present. No lesion.      Comments: Left LE redness, dry skin areas no floculence or lesion  LLE slightly increased edema and redness today   Neurological:      General: No focal deficit present.      Mental Status: She is alert. Mental status is at baseline.   Psychiatric:         Mood and Affect: Mood normal.         Behavior: Behavior normal.           Assessment/Plan:     Assessment & Plan  Severe sepsis  - 2/2 cellulitis  Continue vancomycin, added rocephin d/c zosyn on 07/02/2025  Imrpoving slowly   Parkinson's disease  - home meds  Bipolar disorder, unspecified  - home meds  Cellulitis  - IV vancomycin a  Change to rocephin  Redness about the same  US neg for DVT  Will get US arterial LE  Hypophosphatemia  stable  Hypomagnesemia  Patient with noted electrolyte deficiencies with Hypo-Magnesemia. Latest electrolytes have been reviewed and values are   Magnesium Level   Date Value Ref Range Status   06/30/2025 2.10 1.80 - 2.40 mg/dL Final   . Will continue to monitor electrolytes closely. Will replace the affected electrolytes and repeat labs to  be done after interventions completed.    improved  Anemia  Anemia is likely due to Iron deficiency. Most recent hemoglobin and hematocrit are listed below.  Recent Labs     07/01/25 0448 07/02/25 0448 07/03/25 0454   HGB 11.2* 10.5* 10.2*   HCT 34.3* 31.8* 30.7*       Plan  - Monitor serial CBC: Daily  - Transfuse PRBC if patient becomes hemodynamically unstable, symptomatic or H/H drops below 7/21.  - Patient has not received any PRBC transfusions to date  - Patient's anemia is currently stable  -    Hyponatremia  Hyponatremia is likely due to Dehydration/hypovolemia. The patient's most recent sodium results are listed below.  Recent Labs     07/01/25 0448 07/02/25 0448 07/03/25 0454    143 144       Plan  - Correct the sodium by 4-6mEq in 24 hours.   - Obtain the following studies: rsg.  - Will treat the hyponatremia with IV fluids as follows: ns  - Monitor sodium Daily.   - Patient hyponatremia is stable  -    Thrombocytopenia  The likely etiology of thrombocytopenia is infection. The patients 3 most recent labs are listed below.  Recent Labs     07/01/25 0448 07/02/25 0448 07/03/25 0454   * 113* 144       Plan  - Will transfuse if platelet count is <50k (if undergoing surgical procedure or have active bleeding).  -      Primary hypertension  Patient's blood pressure range in the last 24 hours was: BP  Min: 137/53  Max: 187/80.The patient's inpatient anti-hypertensive regimen is listed below:  Current Antihypertensives  metoprolol succinate (TOPROL-XL) 24 hr tablet 25 mg, Daily, Oral  losartan tablet 50 mg, Daily, Oral  amLODIPine tablet 2.5 mg, Nightly, Oral  cloNIDine tablet 0.2 mg, Every 6 hours PRN, Oral  hydrALAZINE injection 10 mg, Every 4 hours PRN, Intravenous  furosemide injection 40 mg, Once, Intravenous    Plan  - BP is controlled, no changes needed to their regimen  -  contiue current meds    VTE Risk Mitigation (From admission, onward)           Ordered     enoxaparin injection  40 mg  Daily         06/28/25 2208     IP VTE HIGH RISK PATIENT  Once         06/28/25 2208                                  Riana Lael MD  Department of Hospital Medicine  [facility]-Med/Surg

## 2025-07-03 NOTE — ASSESSMENT & PLAN NOTE
- IV vancomycin a  Change to rocephin  Redness about the same  US neg for DVT  Will get US arterial LE

## 2025-07-04 LAB
ANION GAP SERPL CALC-SCNC: 5 MEQ/L (ref 2–13)
BASOPHILS # BLD AUTO: 0.06 X10(3)/MCL (ref 0.01–0.08)
BASOPHILS NFR BLD AUTO: 0.6 % (ref 0.1–1.2)
BUN SERPL-MCNC: 13 MG/DL (ref 7–20)
CALCIUM SERPL-MCNC: 8.6 MG/DL (ref 8.4–10.2)
CHLORIDE SERPL-SCNC: 110 MMOL/L (ref 98–110)
CO2 SERPL-SCNC: 26 MMOL/L (ref 21–32)
CREAT SERPL-MCNC: 1.27 MG/DL (ref 0.66–1.25)
CREAT SERPL-MCNC: 1.43 MG/DL (ref 0.66–1.25)
CREAT/UREA NIT SERPL: 10 (ref 12–20)
EOSINOPHIL # BLD AUTO: 0.08 X10(3)/MCL (ref 0.04–0.36)
EOSINOPHIL NFR BLD AUTO: 0.8 % (ref 0.7–7)
ERYTHROCYTE [DISTWIDTH] IN BLOOD BY AUTOMATED COUNT: 13 % (ref 11–14.5)
GFR SERPLBLD CREATININE-BSD FMLA CKD-EPI: 36 ML/MIN/1.73/M2
GFR SERPLBLD CREATININE-BSD FMLA CKD-EPI: 42 ML/MIN/1.73/M2
GLUCOSE SERPL-MCNC: 101 MG/DL (ref 70–115)
HCT VFR BLD AUTO: 33.8 % (ref 36–48)
HGB BLD-MCNC: 11.1 G/DL (ref 11.8–16)
IMM GRANULOCYTES # BLD AUTO: 0.06 X10(3)/MCL (ref 0–0.03)
IMM GRANULOCYTES NFR BLD AUTO: 0.6 % (ref 0–0.5)
LYMPHOCYTES # BLD AUTO: 1.76 X10(3)/MCL (ref 1.16–3.74)
LYMPHOCYTES NFR BLD AUTO: 16.9 % (ref 20–55)
MCH RBC QN AUTO: 31.8 PG (ref 27–34)
MCHC RBC AUTO-ENTMCNC: 32.8 G/DL (ref 31–37)
MCV RBC AUTO: 96.8 FL (ref 79–99)
MONOCYTES # BLD AUTO: 0.89 X10(3)/MCL (ref 0.24–0.36)
MONOCYTES NFR BLD AUTO: 8.5 % (ref 4.7–12.5)
NEUTROPHILS # BLD AUTO: 7.57 X10(3)/MCL (ref 1.56–6.13)
NEUTROPHILS NFR BLD AUTO: 72.6 % (ref 37–73)
NRBC BLD AUTO-RTO: 0 %
PLATELET # BLD AUTO: 188 X10(3)/MCL (ref 140–371)
PMV BLD AUTO: 10.6 FL (ref 9.4–12.4)
POTASSIUM SERPL-SCNC: 3 MMOL/L (ref 3.5–5.1)
RBC # BLD AUTO: 3.49 X10(6)/MCL (ref 4–5.1)
SODIUM SERPL-SCNC: 141 MMOL/L (ref 136–145)
VANCOMYCIN TROUGH SERPL-MCNC: 30.3 UG/ML (ref 10–20)
WBC # BLD AUTO: 10.42 X10(3)/MCL (ref 4–11.5)

## 2025-07-04 PROCEDURE — 94761 N-INVAS EAR/PLS OXIMETRY MLT: CPT

## 2025-07-04 PROCEDURE — 80202 ASSAY OF VANCOMYCIN: CPT | Performed by: FAMILY MEDICINE

## 2025-07-04 PROCEDURE — 82565 ASSAY OF CREATININE: CPT | Performed by: FAMILY MEDICINE

## 2025-07-04 PROCEDURE — 85025 COMPLETE CBC W/AUTO DIFF WBC: CPT | Performed by: FAMILY MEDICINE

## 2025-07-04 PROCEDURE — 80048 BASIC METABOLIC PNL TOTAL CA: CPT | Performed by: FAMILY MEDICINE

## 2025-07-04 PROCEDURE — 36415 COLL VENOUS BLD VENIPUNCTURE: CPT | Performed by: FAMILY MEDICINE

## 2025-07-04 PROCEDURE — 25000003 PHARM REV CODE 250: Performed by: FAMILY MEDICINE

## 2025-07-04 PROCEDURE — 21400001 HC TELEMETRY ROOM

## 2025-07-04 PROCEDURE — 63600175 PHARM REV CODE 636 W HCPCS: Performed by: FAMILY MEDICINE

## 2025-07-04 RX ORDER — ENOXAPARIN SODIUM 100 MG/ML
30 INJECTION SUBCUTANEOUS EVERY 24 HOURS
Status: DISCONTINUED | OUTPATIENT
Start: 2025-07-04 | End: 2025-07-05 | Stop reason: HOSPADM

## 2025-07-04 RX ADMIN — CARBIDOPA AND LEVODOPA 1 TABLET: 10; 100 TABLET ORAL at 08:07

## 2025-07-04 RX ADMIN — METOPROLOL SUCCINATE 25 MG: 25 TABLET, EXTENDED RELEASE ORAL at 08:07

## 2025-07-04 RX ADMIN — VENLAFAXINE HYDROCHLORIDE 75 MG: 75 CAPSULE, EXTENDED RELEASE ORAL at 08:07

## 2025-07-04 RX ADMIN — SODIUM CHLORIDE 1000 MG: 9 INJECTION, SOLUTION INTRAVENOUS at 04:07

## 2025-07-04 RX ADMIN — CARBIDOPA AND LEVODOPA 1 TABLET: 10; 100 TABLET ORAL at 05:07

## 2025-07-04 RX ADMIN — CARBIDOPA AND LEVODOPA 1 TABLET: 10; 100 TABLET ORAL at 01:07

## 2025-07-04 RX ADMIN — FERROUS SULFATE TAB 325 MG (65 MG ELEMENTAL FE) 1 EACH: 325 (65 FE) TAB at 08:07

## 2025-07-04 RX ADMIN — DIVALPROEX SODIUM 500 MG: 250 TABLET, DELAYED RELEASE ORAL at 08:07

## 2025-07-04 RX ADMIN — LORAZEPAM 0.5 MG: 0.5 TABLET ORAL at 08:07

## 2025-07-04 RX ADMIN — AMLODIPINE BESYLATE 2.5 MG: 2.5 TABLET ORAL at 08:07

## 2025-07-04 RX ADMIN — ASPIRIN 81 MG: 81 TABLET ORAL at 08:07

## 2025-07-04 RX ADMIN — LOSARTAN POTASSIUM 50 MG: 50 TABLET, FILM COATED ORAL at 08:07

## 2025-07-04 RX ADMIN — CARBIDOPA AND LEVODOPA 1 TABLET: 25; 100 TABLET ORAL at 08:07

## 2025-07-04 RX ADMIN — CARBIDOPA AND LEVODOPA 1 TABLET: 25; 100 TABLET ORAL at 01:07

## 2025-07-04 RX ADMIN — CEFTRIAXONE SODIUM 1 G: 1 INJECTION, POWDER, FOR SOLUTION INTRAMUSCULAR; INTRAVENOUS at 11:07

## 2025-07-04 RX ADMIN — CARBIDOPA AND LEVODOPA 1 TABLET: 25; 100 TABLET ORAL at 05:07

## 2025-07-04 RX ADMIN — ENOXAPARIN SODIUM 30 MG: 30 INJECTION SUBCUTANEOUS at 05:07

## 2025-07-04 NOTE — PLAN OF CARE
Problem: Adult Inpatient Plan of Care  Goal: Plan of Care Review  Outcome: Ongoing  Goal: Patient-Specific Goal (Individualized)  Outcome: Ongoing  Goal: Absence of Hospital-Acquired Illness or Injury  Outcome: Ongoing  Goal: Optimal Comfort and Wellbeing  Outcome: Ongoing  Goal: Readiness for Transition of Care  Outcome: Ongoing     Problem: Wound  Goal: Optimal Coping  Outcome: Ongoing  Goal: Optimal Functional Ability  Outcome: Ongoing  Goal: Absence of Infection Signs and Symptoms  Outcome: Ongoing  Goal: Improved Oral Intake  Outcome: Ongoing  Goal: Optimal Pain Control and Function  Outcome: Ongoing  Goal: Skin Health and Integrity  Outcome: Ongoing  Goal: Optimal Wound Healing  Outcome: Ongoing     Problem: Sepsis/Septic Shock  Goal: Optimal Coping  Outcome: Ongoing  Goal: Absence of Bleeding  Outcome: Ongoing  Goal: Blood Glucose Level Within Targeted Range  Outcome: Ongoing  Goal: Absence of Infection Signs and Symptoms  Outcome: Ongoing  Goal: Optimal Nutrition Intake  Outcome: Ongoing     Problem: Skin Injury Risk Increased  Goal: Skin Health and Integrity  Outcome: Ongoing     Problem: Fall Injury Risk  Goal: Absence of Fall and Fall-Related Injury  Outcome: Ongoing     Problem: Infection  Goal: Absence of Infection Signs and Symptoms  Outcome: Ongoing

## 2025-07-04 NOTE — PT/OT/SLP PROGRESS
Physical Therapy Treatment    Patient Name:  Jennifer Rob   MRN:  [id number]    Recommendations:     Discharge Recommendations: Low Intensity Therapy  Discharge Equipment Recommendations: none  Barriers to discharge: medical condition    Assessment:     Jennifer Rob is a 84 y.o. female admitted with a medical diagnosis of Severe sepsis.  She presents with the following impairments/functional limitations: weakness, impaired endurance, impaired self care skills, impaired functional mobility, gait instability, impaired balance, impaired cognition, decreased lower extremity function, decreased safety awareness .    Rehab Prognosis: Good; patient would benefit from acute skilled PT services to address these deficits and reach maximum level of function.    Recent Surgery: * No surgery found *      Plan:     During this hospitalization, patient to be seen 5 x/week (5-6x weekly/1-2x daily) to address the identified rehab impairments via gait training, therapeutic exercises, therapeutic activities and progress toward the following goals:    Plan of Care Expires:  07/30/25    Subjective     Chief Complaint: walker deviates toward the left, just like the one she has at home.  Patient/Family Comments/goals: to get well enough to go home soon.  The days are long here when I am used to doing things at home, including exercise.  Pain/Comfort:         Objective:     Communicated with nursg and family prior to session.  Patient found supine with   upon PT entry to room.     General Precautions: Standard, fall  Orthopedic Precautions: N/A  Braces: N/A  Respiratory Status: Room air     Functional Mobility:  Transfers:     Sit to Stand:  stand by assistance with rolling walker  Gait: supervision.  Noted deviation toward the left.  Corrects this with a slide of the walker over toward her right.  Changes direction to get on-track again.  Lasts for a minute or about 45'.  Then has to correct again.  Balance: needed no help to  prevent a fall/potential fall.      AM-PAC 6 CLICK MOBILITY          Treatment & Education:  Provided a safe environment for her to stand up and get some exercise done.  Some instruction to perform more safely.    Patient left up in chair with call button in reach and niece  present..    GOALS:   Multidisciplinary Problems       Physical Therapy Goals          Problem: Physical Therapy    Goal Priority Disciplines Outcome Interventions   Physical Therapy Goal     PT, PT/OT Progressing    Description: Goals to be met by: discharge     Patient will increase functional independence with mobility by performin. Supine to sit with Stand-by Assistance  2. Sit to stand transfer with Stand-by Assistance  3. Gait  x 50 feet with Stand-by Assistance using Rolling Walker.                          DME Justifications:  No DME recommended requiring DME justifications    Time Tracking:     PT Received On:    PT Start Time: 1145     PT Stop Time: 1215  PT Total Time (min): 30 min     Billable Minutes: Gait Training 15 and Therapeutic Activity 15    Treatment Type: Treatment  PT/PTA: PT           2025

## 2025-07-04 NOTE — ASSESSMENT & PLAN NOTE
The likely etiology of thrombocytopenia is infection. The patients 3 most recent labs are listed below.  Recent Labs     07/02/25  0448 07/03/25  0454 07/04/25  0706   * 144 188     Plan  - Will transfuse if platelet count is <50k (if undergoing surgical procedure or have active bleeding).  -

## 2025-07-04 NOTE — PROGRESS NOTES
Pharmacist Renal Dose Adjustment Note    Jennifer Rob is a 84 y.o. female being treated with the medication Lovenox    Patient Data:    Vital Signs (Most Recent):  Temp: 98.4 °F (36.9 °C) (07/04/25 0745)  Pulse: 75 (07/04/25 0745)  Resp: 20 (07/04/25 0745)  BP: (!) 181/78 (07/04/25 0745)  SpO2: 96 % (07/04/25 0745) Vital Signs (72h Range):  Temp:  [96.6 °F (35.9 °C)-99.4 °F (37.4 °C)]   Pulse:  [63-97]   Resp:  [16-20]   BP: (118-187)/(53-81)   SpO2:  [93 %-99 %]      Recent Labs   Lab 07/02/25  0448 07/03/25  0454 07/04/25  0707   CREATININE 0.88 1.11 1.27*     Serum creatinine: 1.27 mg/dL (H) 07/04/25 0707  Estimated creatinine clearance: 26.3 mL/min (A)    Medication:Lovenox dose: 40mg frequency q24h will be changed to medication:Lovenox dose:30mg frequency:q24h    Pharmacist's Name: Jacy Hall  Pharmacist's Extension: [phone]

## 2025-07-04 NOTE — PROGRESS NOTES
[facility]/Surg  [facility] Medicine  PROGRESS NOTE    Patient Name: Jennifer Rob  MRN: [id number]  Patient Class: IP- Inpatient  Admission Date: 6/28/2025  Attending Physician: Riana Leal MD   Primary Care Provider: Zak Esteves MD         Patient information was obtained from patient and ER records.     Subjective:     Principal Problem:Severe sepsis    Chief Complaint:   Chief Complaint   Patient presents with    Fever    Altered Mental Status    Fall     Family reports pt fell today, unwitnessed, wound to right elbow, pt reports she hit her head, also c/o LLE redness, swelling, warmth. Hx: parkinson's.        HPI: 84-year-old F patient with PMH of Parkinson's disease and bipolar disorder presents to the ER with a fall at home after she tripped on the floor. Family found her on the floor. Patient denies having any loss of consciousness, seizure activity, chest pain shortness of breath or abdominal pain. Family report swelling and erythema on the LLE that started in the calf and then extended circumferentially. Patient on arrival has a temperature of 102.4° F. Labs showed WBC of 20.84, phosphorus of 2.3, Mg 1.7, BNP 2670 and lactate 2.5. She was started on IV antibiotics and admitted for further management.     06/29/2025 pt admitted with fecer and sepsis thought to be due to RLE cellulitis, h/ o Parkinson's disease daughter noted limping on right leg x 1-2 days prior to fever.  06/30/2025 more alert today, left LE redness is less  07/01/2025 redness to LLE a bit more and a bit more swollen, WBC stable  Ovrall feeling better  07/02/2025 redness left LE, some bruising not any better last w 2 days, looked a little better initially after abx started, still warm and red. US negative for DVT  07/03/2025 PT STILL WITH LLE redness, bruising, some warmth, US LE negative for DVT.    Pt denies pain, area is angry and red.  07/04/2025 CT no abscess or fluid of LE, likely some arterial  insufficiency based on arterial UA and CTA.    Redness is a bit better seems stable    Review of Systems   Unable to perform ROS: Mental status change     Vitals:    07/04/25 1103   BP: (!) 160/74   Pulse: 82   Resp: 18   Temp: 98.9 °F (37.2 °C)     Physical Exam  Vitals and nursing note reviewed. Exam conducted with a chaperone present.   Constitutional:       General: She is not in acute distress.     Appearance: Normal appearance. She is normal weight. She is not ill-appearing.   HENT:      Head: Atraumatic.   Cardiovascular:      Rate and Rhythm: Normal rate and regular rhythm.      Pulses: Normal pulses.      Heart sounds: Normal heart sounds.   Pulmonary:      Effort: Pulmonary effort is normal.      Breath sounds: Normal breath sounds.   Abdominal:      General: Abdomen is flat.      Palpations: Abdomen is soft.      Tenderness: There is no abdominal tenderness.   Musculoskeletal:      Right lower leg: No edema.      Left lower leg: No edema.   Skin:     General: Skin is warm and dry.      Findings: Erythema (less today) and rash present. No lesion.      Comments: Left LE redness, dry skin areas no floculence or lesion  LLE slightly increased edema and redness today   Neurological:      General: No focal deficit present.      Mental Status: She is alert. Mental status is at baseline.   Psychiatric:         Mood and Affect: Mood normal.         Behavior: Behavior normal.           Assessment/Plan:     Assessment & Plan  Severe sepsis  - 2/2 cellulitis  Continue vancomycin, added rocephin d/c zosyn on 07/02/2025  Imrpoving slowly   Discussed with pt daughter kate continue rocephin  Will need vascular work up for possible intervention  Parkinson's disease  - home meds  Bipolar disorder, unspecified  - home meds  Cellulitis  - IV vancomycin a  Change to rocephin  Redness about the same  US neg for DVT  Will get US arterial LE  Hypophosphatemia  stable  Hypomagnesemia  Patient with noted electrolyte deficiencies  with Hypo-Magnesemia. Latest electrolytes have been reviewed and values are   Magnesium Level   Date Value Ref Range Status   06/30/2025 2.10 1.80 - 2.40 mg/dL Final   . Will continue to monitor electrolytes closely. Will replace the affected electrolytes and repeat labs to be done after interventions completed.    improved  Anemia  Anemia is likely due to Iron deficiency. Most recent hemoglobin and hematocrit are listed below.  Recent Labs     07/02/25 0448 07/03/25 0454 07/04/25  0706   HGB 10.5* 10.2* 11.1*   HCT 31.8* 30.7* 33.8*     Plan  - Monitor serial CBC: Daily  - Transfuse PRBC if patient becomes hemodynamically unstable, symptomatic or H/H drops below 7/21.  - Patient has not received any PRBC transfusions to date  - Patient's anemia is currently stable  -    Hyponatremia  Hyponatremia is likely due to Dehydration/hypovolemia. The patient's most recent sodium results are listed below.  Recent Labs     07/02/25 0448 07/03/25 0454 07/04/25  0707    144 141     Plan  - Correct the sodium by 4-6mEq in 24 hours.   - Obtain the following studies: rsg.  - Will treat the hyponatremia with IV fluids as follows: ns  - Monitor sodium Daily.   - Patient hyponatremia is stable  -    Thrombocytopenia  The likely etiology of thrombocytopenia is infection. The patients 3 most recent labs are listed below.  Recent Labs     07/02/25 0448 07/03/25 0454 07/04/25 0706   * 144 188     Plan  - Will transfuse if platelet count is <50k (if undergoing surgical procedure or have active bleeding).  -      Primary hypertension  Patient's blood pressure range in the last 24 hours was: BP  Min: 160/74  Max: 181/78.The patient's inpatient anti-hypertensive regimen is listed below:  Current Antihypertensives  metoprolol succinate (TOPROL-XL) 24 hr tablet 25 mg, Daily, Oral  losartan tablet 50 mg, Daily, Oral  amLODIPine tablet 2.5 mg, Nightly, Oral  cloNIDine tablet 0.2 mg, Every 6 hours PRN, Oral  hydrALAZINE  injection 10 mg, Every 4 hours PRN, Intravenous    Plan  - BP is controlled, no changes needed to their regimen  -  contiue current meds    VTE Risk Mitigation (From admission, onward)           Ordered     enoxaparin injection 30 mg  Daily         07/04/25 0841     IP VTE HIGH RISK PATIENT  Once         06/28/25 220                                  Riana Leal MD  Department of Hospital Medicine  [facility]/Surg

## 2025-07-04 NOTE — ASSESSMENT & PLAN NOTE
Anemia is likely due to Iron deficiency. Most recent hemoglobin and hematocrit are listed below.  Recent Labs     07/02/25  0448 07/03/25  0454 07/04/25  0706   HGB 10.5* 10.2* 11.1*   HCT 31.8* 30.7* 33.8*     Plan  - Monitor serial CBC: Daily  - Transfuse PRBC if patient becomes hemodynamically unstable, symptomatic or H/H drops below 7/21.  - Patient has not received any PRBC transfusions to date  - Patient's anemia is currently stable  -

## 2025-07-04 NOTE — PROGRESS NOTES
Pharmacokinetic Assessment Follow Up: IV Vancomycin    Vancomycin serum concentration assessment(s):    The trough level was drawn correctly and can be used to guide therapy at this time. The measurement is above the desired definitive target range of 10 to 15 mcg/mL.    Vancomycin Regimen Plan:    Discontinue the scheduled vancomycin regimen and re-dose when the random level is less than 20 mcg/mL, next level to be drawn at 0600 on 07/05/25.    Drug levels (last 3 results):  Recent Labs   Lab Result Units 07/02/25  1504 07/04/25  1457   Vancomycin Trough ug/ml 16.8 30.3*       Pharmacy will continue to follow and monitor vancomycin.    Please contact pharmacy at extension [phone] for questions regarding this assessment.    Thank you for the consult,   Jacy Hall       Patient brief summary:  Jennifer Rob is a 84 y.o. female initiated on antimicrobial therapy with IV Vancomycin for treatment of skin & soft tissue infection      Drug Allergies:   Review of patient's allergies indicates:   Allergen Reactions    Sulfa (sulfonamide antibiotics) Rash     unknown reaction.   has been years       Actual Body Weight:   51.4kg    Renal Function:   Estimated Creatinine Clearance: 26.3 mL/min (A) (based on SCr of 1.27 mg/dL (H)).,     Dialysis Method (if applicable):  N/A    CBC (last 72 hours):  Recent Labs   Lab Result Units 07/02/25  0448 07/03/25  0454 07/04/25  0706   WBC x10(3)/mcL 9.92 8.58 10.42   Hgb g/dL 10.5* 10.2* 11.1*   Hct % 31.8* 30.7* 33.8*   Platelet x10(3)/mcL 113* 144 188   Mono % % 7.6 9.7 8.5   Eos % % 1.7 1.9 0.8   Basophil % % 0.5 0.6 0.6       Metabolic Panel (last 72 hours):  Recent Labs   Lab Result Units 07/02/25  0448 07/03/25  0454 07/04/25  0707   Sodium mmol/L 143 144 141   Potassium mmol/L 3.6 3.5 3.0*   Chloride mmol/L 112* 116* 110   CO2 mmol/L 27 26 26   Glucose mg/dL 105 91 101   Blood Urea Nitrogen mg/dL 8 9 13   Creatinine mg/dL 0.88 1.11 1.27*       Vancomycin  Administrations:  vancomycin given in the last 96 hours                     vancomycin (VANCOCIN) 1,000 mg in 0.9% NaCl 250 mL IVPB (admixture device) (mg) 1,000 mg New Bag 07/04/25 0412      Restarted 07/03/25 1749      Restarted  1643      Restarted  1641     1,000 mg New Bag  1633     1,000 mg New Bag  0410     1,000 mg New Bag 07/02/25 1659      Restarted  0528     1,000 mg New Bag  0421     1,000 mg New Bag 07/01/25 1542    vancomycin 750 mg in 0.9% NaCl 250 mL IVPB (admixture device) (mg) 750 mg New Bag 06/30/25 2240                    Microbiologic Results:  Microbiology Results (last 7 days)       Procedure Component Value Units Date/Time    Blood Culture x two cultures. Draw prior to antibiotics [[id number]] Collected: 06/28/25 2020    Order Status: Completed Specimen: Blood Updated: 07/03/25 2100     Blood Culture No Growth at 5 days    Blood Culture x two cultures. Draw prior to antibiotics [[id number]] Collected: 06/28/25 2033    Order Status: Completed Specimen: Blood Updated: 07/03/25 2100     Blood Culture No Growth at 5 days

## 2025-07-04 NOTE — ASSESSMENT & PLAN NOTE
Patient's blood pressure range in the last 24 hours was: BP  Min: 160/74  Max: 181/78.The patient's inpatient anti-hypertensive regimen is listed below:  Current Antihypertensives  metoprolol succinate (TOPROL-XL) 24 hr tablet 25 mg, Daily, Oral  losartan tablet 50 mg, Daily, Oral  amLODIPine tablet 2.5 mg, Nightly, Oral  cloNIDine tablet 0.2 mg, Every 6 hours PRN, Oral  hydrALAZINE injection 10 mg, Every 4 hours PRN, Intravenous    Plan  - BP is controlled, no changes needed to their regimen  -  contiue current meds

## 2025-07-04 NOTE — ASSESSMENT & PLAN NOTE
Hyponatremia is likely due to Dehydration/hypovolemia. The patient's most recent sodium results are listed below.  Recent Labs     07/02/25  0448 07/03/25  0454 07/04/25  0707    144 141     Plan  - Correct the sodium by 4-6mEq in 24 hours.   - Obtain the following studies: rsg.  - Will treat the hyponatremia with IV fluids as follows: ns  - Monitor sodium Daily.   - Patient hyponatremia is stable  -

## 2025-07-05 VITALS
RESPIRATION RATE: 18 BRPM | TEMPERATURE: 99 F | BODY MASS INDEX: 19.35 KG/M2 | SYSTOLIC BLOOD PRESSURE: 141 MMHG | DIASTOLIC BLOOD PRESSURE: 55 MMHG | OXYGEN SATURATION: 96 % | WEIGHT: 113.31 LBS | HEART RATE: 72 BPM | HEIGHT: 64 IN

## 2025-07-05 PROBLEM — E87.6 HYPOKALEMIA: Status: ACTIVE | Noted: 2025-07-05

## 2025-07-05 LAB
ANION GAP SERPL CALC-SCNC: 3 MEQ/L (ref 2–13)
BASOPHILS # BLD AUTO: 0.06 X10(3)/MCL (ref 0.01–0.08)
BASOPHILS NFR BLD AUTO: 0.6 % (ref 0.1–1.2)
BUN SERPL-MCNC: 15 MG/DL (ref 7–20)
CALCIUM SERPL-MCNC: 8.6 MG/DL (ref 8.4–10.2)
CHLORIDE SERPL-SCNC: 107 MMOL/L (ref 98–110)
CO2 SERPL-SCNC: 31 MMOL/L (ref 21–32)
CREAT SERPL-MCNC: 1.43 MG/DL (ref 0.66–1.25)
CREAT/UREA NIT SERPL: 10 (ref 12–20)
EOSINOPHIL # BLD AUTO: 0.29 X10(3)/MCL (ref 0.04–0.36)
EOSINOPHIL NFR BLD AUTO: 2.8 % (ref 0.7–7)
ERYTHROCYTE [DISTWIDTH] IN BLOOD BY AUTOMATED COUNT: 13.3 % (ref 11–14.5)
GFR SERPLBLD CREATININE-BSD FMLA CKD-EPI: 36 ML/MIN/1.73/M2
GLUCOSE SERPL-MCNC: 86 MG/DL (ref 70–115)
HCT VFR BLD AUTO: 29.3 % (ref 36–48)
HGB BLD-MCNC: 9.6 G/DL (ref 11.8–16)
IMM GRANULOCYTES # BLD AUTO: 0.09 X10(3)/MCL (ref 0–0.03)
IMM GRANULOCYTES NFR BLD AUTO: 0.9 % (ref 0–0.5)
LYMPHOCYTES # BLD AUTO: 2.21 X10(3)/MCL (ref 1.16–3.74)
LYMPHOCYTES NFR BLD AUTO: 21 % (ref 20–55)
MCH RBC QN AUTO: 31.7 PG (ref 27–34)
MCHC RBC AUTO-ENTMCNC: 32.8 G/DL (ref 31–37)
MCV RBC AUTO: 96.7 FL (ref 79–99)
MONOCYTES # BLD AUTO: 0.89 X10(3)/MCL (ref 0.24–0.36)
MONOCYTES NFR BLD AUTO: 8.4 % (ref 4.7–12.5)
NEUTROPHILS # BLD AUTO: 7 X10(3)/MCL (ref 1.56–6.13)
NEUTROPHILS NFR BLD AUTO: 66.3 % (ref 37–73)
NRBC BLD AUTO-RTO: 0 %
PLATELET # BLD AUTO: 222 X10(3)/MCL (ref 140–371)
PMV BLD AUTO: 10.4 FL (ref 9.4–12.4)
POTASSIUM SERPL-SCNC: 3.2 MMOL/L (ref 3.5–5.1)
RBC # BLD AUTO: 3.03 X10(6)/MCL (ref 4–5.1)
SODIUM SERPL-SCNC: 141 MMOL/L (ref 136–145)
VANCOMYCIN SERPL-MCNC: 22.7 UG/ML (ref 5–10)
WBC # BLD AUTO: 10.54 X10(3)/MCL (ref 4–11.5)

## 2025-07-05 PROCEDURE — 25000003 PHARM REV CODE 250: Performed by: FAMILY MEDICINE

## 2025-07-05 PROCEDURE — 80048 BASIC METABOLIC PNL TOTAL CA: CPT | Performed by: FAMILY MEDICINE

## 2025-07-05 PROCEDURE — 36415 COLL VENOUS BLD VENIPUNCTURE: CPT | Performed by: FAMILY MEDICINE

## 2025-07-05 PROCEDURE — 80202 ASSAY OF VANCOMYCIN: CPT | Performed by: FAMILY MEDICINE

## 2025-07-05 PROCEDURE — 85025 COMPLETE CBC W/AUTO DIFF WBC: CPT | Performed by: FAMILY MEDICINE

## 2025-07-05 PROCEDURE — 94761 N-INVAS EAR/PLS OXIMETRY MLT: CPT

## 2025-07-05 PROCEDURE — 63600175 PHARM REV CODE 636 W HCPCS: Performed by: FAMILY MEDICINE

## 2025-07-05 RX ORDER — CARBIDOPA AND LEVODOPA 25; 100 MG/1; MG/1
1 TABLET ORAL 3 TIMES DAILY
Start: 2025-07-05 | End: 2026-07-05

## 2025-07-05 RX ORDER — CEFDINIR 300 MG/1
300 CAPSULE ORAL DAILY
Qty: 7 CAPSULE | Refills: 0 | Status: SHIPPED | OUTPATIENT
Start: 2025-07-05 | End: 2025-07-12

## 2025-07-05 RX ADMIN — CARBIDOPA AND LEVODOPA 1 TABLET: 25; 100 TABLET ORAL at 08:07

## 2025-07-05 RX ADMIN — LOSARTAN POTASSIUM 50 MG: 50 TABLET, FILM COATED ORAL at 08:07

## 2025-07-05 RX ADMIN — CARBIDOPA AND LEVODOPA 1 TABLET: 10; 100 TABLET ORAL at 08:07

## 2025-07-05 RX ADMIN — CEFTRIAXONE SODIUM 1 G: 1 INJECTION, POWDER, FOR SOLUTION INTRAMUSCULAR; INTRAVENOUS at 10:07

## 2025-07-05 RX ADMIN — CARBIDOPA AND LEVODOPA 1 TABLET: 10; 100 TABLET ORAL at 01:07

## 2025-07-05 RX ADMIN — VENLAFAXINE HYDROCHLORIDE 75 MG: 75 CAPSULE, EXTENDED RELEASE ORAL at 08:07

## 2025-07-05 RX ADMIN — METOPROLOL SUCCINATE 25 MG: 25 TABLET, EXTENDED RELEASE ORAL at 08:07

## 2025-07-05 RX ADMIN — CARBIDOPA AND LEVODOPA 1 TABLET: 25; 100 TABLET ORAL at 01:07

## 2025-07-05 RX ADMIN — FERROUS SULFATE TAB 325 MG (65 MG ELEMENTAL FE) 1 EACH: 325 (65 FE) TAB at 08:07

## 2025-07-05 NOTE — PLAN OF CARE
Problem: Adult Inpatient Plan of Care  Goal: Plan of Care Review  Outcome: Met  Goal: Patient-Specific Goal (Individualized)  Outcome: Met  Goal: Absence of Hospital-Acquired Illness or Injury  Outcome: Met  Goal: Optimal Comfort and Wellbeing  Outcome: Met  Goal: Readiness for Transition of Care  Outcome: Met     Problem: Wound  Goal: Optimal Coping  Outcome: Met  Goal: Optimal Functional Ability  Outcome: Met  Goal: Absence of Infection Signs and Symptoms  Outcome: Met  Goal: Improved Oral Intake  Outcome: Met  Goal: Optimal Pain Control and Function  Outcome: Met  Goal: Skin Health and Integrity  Outcome: Met  Goal: Optimal Wound Healing  Outcome: Met     Problem: Sepsis/Septic Shock  Goal: Optimal Coping  Outcome: Met  Goal: Absence of Bleeding  Outcome: Met  Goal: Blood Glucose Level Within Targeted Range  Outcome: Met  Goal: Absence of Infection Signs and Symptoms  Outcome: Met  Goal: Optimal Nutrition Intake  Outcome: Met     Problem: Skin Injury Risk Increased  Goal: Skin Health and Integrity  Outcome: Met     Problem: Fall Injury Risk  Goal: Absence of Fall and Fall-Related Injury  Outcome: Met     Problem: Infection  Goal: Absence of Infection Signs and Symptoms  Outcome: Met

## 2025-07-05 NOTE — ASSESSMENT & PLAN NOTE
Anemia is likely due to Iron deficiency. Most recent hemoglobin and hematocrit are listed below.  Recent Labs     07/03/25  0454 07/04/25  0706 07/05/25  0543   HGB 10.2* 11.1* 9.6*   HCT 30.7* 33.8* 29.3*     imrpoved   Telephone Encounter by Rose Blair MD at 09/14/18 12:07 PM     Author:  Rose Blair MD Service:  (none) Author Type:  Physician     Filed:  09/14/18 12:08 PM Encounter Date:  9/14/2018 Status:  Signed     :  Rose Blair MD (Physician)            If pain is that severe, should be seen today  If pain is tolerable and stable, she may try OTC Zantac BID for now and see Dr. Severino Overall on Monday[BK1.1M]      Revision History        User Key Date/Time User Provider Type Action    > BK1.1 09/14/18 12:08 PM Rose Blair MD Physician Sign    M - Manual

## 2025-07-05 NOTE — HOSPITAL COURSE
Principal Problem:Severe sepsis     Chief Complaint:        Chief Complaint   Patient presents with    Fever    Altered Mental Status    Fall       Family reports pt fell today, unwitnessed, wound to right elbow, pt reports she hit her head, also c/o LLE redness, swelling, warmth. Hx: parkinson's.         HPI: 84-year-old F patient with PMH of Parkinson's disease and bipolar disorder presents to the ER with a fall at home after she tripped on the floor. Family found her on the floor. Patient denies having any loss of consciousness, seizure activity, chest pain shortness of breath or abdominal pain. Family report swelling and erythema on the LLE that started in the calf and then extended circumferentially. Patient on arrival has a temperature of 102.4° F. Labs showed WBC of 20.84, phosphorus of 2.3, Mg 1.7, BNP 2670 and lactate 2.5. She was started on IV antibiotics and admitted for further management.      06/29/2025 pt admitted with fecer and sepsis thought to be due to RLE cellulitis, h/ o Parkinson's disease daughter noted limping on right leg x 1-2 days prior to fever.  06/30/2025 more alert today, left LE redness is less  07/01/2025 redness to LLE a bit more and a bit more swollen, WBC stable  Ovrall feeling better  07/02/2025 redness left LE, some bruising not any better last w 2 days, looked a little better initially after abx started, still warm and red. US negative for DVT  07/03/2025 PT STILL WITH LLE redness, bruising, some warmth, US LE negative for DVT.    Pt denies pain, area is angry and red.  07/04/2025 CT no abscess or fluid of LE, likely some arterial insufficiency based on arterial UA and CTA.    Redness is a bit better seems stable   07/05/2025 DISCHARGE SUMMARY: pt admitted with sepsis and cellulitis of the LLE, initially had fever up to 102.  She was started on vanc and zosyn and WBC was 75124.  US Left LE was negative for DVT.  We changed her to Vanc and rocephin due to persistent redness and  some concern for more strep infection, also CT of the Left LE was negative for any abscess.  US arterial also done on the Left LE does show evidence of some vascular compromise.  Pt has imrpoved with rocephin and is ready for d/c home will send with omnicef.  Recommend she f/u with Dr. Damon to consider further intervention and testing for the vascular disease of Left LE.  She will f/u with PCP.

## 2025-07-05 NOTE — ASSESSMENT & PLAN NOTE
Patient's blood pressure range in the last 24 hours was: BP  Min: 138/56  Max: 165/75.The patient's inpatient anti-hypertensive regimen is listed below:  Current Antihypertensives  metoprolol succinate (TOPROL-XL) 24 hr tablet 25 mg, Daily, Oral  losartan tablet 50 mg, Daily, Oral  amLODIPine tablet 2.5 mg, Nightly, Oral  cloNIDine tablet 0.2 mg, Every 6 hours PRN, Oral  hydrALAZINE injection 10 mg, Every 4 hours PRN, Intravenous    Plan  - BP is controlled, no changes needed to their regimen  -  Resume and continue home meds at discharge

## 2025-07-05 NOTE — PROGRESS NOTES
Pharmacokinetic Assessment Follow Up: IV Vancomycin    Vancomycin serum concentration assessment(s):    The random level was drawn correctly and can be used to guide therapy at this time. The measurement is above the desired definitive target range of 10 to 15 mcg/mL.    Vancomycin Regimen Plan:    Re-dose when the random level is less than 20 mcg/mL, next level to be drawn at 0600 on 07/06/25    Drug levels (last 3 results):  Recent Labs   Lab Result Units 07/02/25  1504 07/04/25  1457 07/05/25  0544   Vancomycin Random ug/ml  --   --  22.7*   Vancomycin Trough ug/ml 16.8 30.3*  --        Pharmacy will continue to follow and monitor vancomycin.    Please contact pharmacy at extension [phone] for questions regarding this assessment.    Thank you for the consult,   Jacy Hall       Patient brief summary:  Jennifer Rob is a 84 y.o. female initiated on antimicrobial therapy with IV Vancomycin for treatment of skin & soft tissue infection      Drug Allergies:   Review of patient's allergies indicates:   Allergen Reactions    Sulfa (sulfonamide antibiotics) Rash     unknown reaction.   has been years       Actual Body Weight:   51.4kg    Renal Function:   Estimated Creatinine Clearance: 23.3 mL/min (A) (based on SCr of 1.43 mg/dL (H)).,     Dialysis Method (if applicable):  N/A    CBC (last 72 hours):  Recent Labs   Lab Result Units 07/03/25  0454 07/04/25  0706 07/05/25  0543   WBC x10(3)/mcL 8.58 10.42 10.54   Hgb g/dL 10.2* 11.1* 9.6*   Hct % 30.7* 33.8* 29.3*   Platelet x10(3)/mcL 144 188 222   Mono % % 9.7 8.5 8.4   Eos % % 1.9 0.8 2.8   Basophil % % 0.6 0.6 0.6       Metabolic Panel (last 72 hours):  Recent Labs   Lab Result Units 07/03/25  0454 07/04/25  0707 07/04/25  1457 07/05/25  0544   Sodium mmol/L 144 141  --  141   Potassium mmol/L 3.5 3.0*  --  3.2*   Chloride mmol/L 116* 110  --  107   CO2 mmol/L 26 26  --  31   Glucose mg/dL 91 101  --  86   Blood Urea Nitrogen mg/dL 9 13  --  15   Creatinine  mg/dL 1.11 1.27* 1.43* 1.43*       Vancomycin Administrations:  vancomycin given in the last 96 hours                     vancomycin (VANCOCIN) 1,000 mg in 0.9% NaCl 250 mL IVPB (admixture device) (mg) 1,000 mg New Bag 07/04/25 0412      Restarted 07/03/25 1749      Restarted  1643      Restarted  1641     1,000 mg New Bag  1633     1,000 mg New Bag  0410     1,000 mg New Bag 07/02/25 1659      Restarted  0528     1,000 mg New Bag  0421     1,000 mg New Bag 07/01/25 1542                    Microbiologic Results:  Microbiology Results (last 7 days)       Procedure Component Value Units Date/Time    Blood Culture x two cultures. Draw prior to antibiotics [[id number]] Collected: 06/28/25 2020    Order Status: Completed Specimen: Blood Updated: 07/03/25 2100     Blood Culture No Growth at 5 days    Blood Culture x two cultures. Draw prior to antibiotics [[id number]] Collected: 06/28/25 2033    Order Status: Completed Specimen: Blood Updated: 07/03/25 2100     Blood Culture No Growth at 5 days

## 2025-07-05 NOTE — ASSESSMENT & PLAN NOTE
Hyponatremia is likely due to Dehydration/hypovolemia. The patient's most recent sodium results are listed below.  Recent Labs     07/03/25  0454 07/04/25  0707 07/05/25  0544    141 141     stable

## 2025-07-05 NOTE — ASSESSMENT & PLAN NOTE
The likely etiology of thrombocytopenia is infection. The patients 3 most recent labs are listed below.  Recent Labs     07/03/25  0454 07/04/25  0706 07/05/25  0543    188 222   stable

## 2025-07-05 NOTE — ASSESSMENT & PLAN NOTE
Patient's most recent potassium results are listed below.   Recent Labs     07/03/25  0454 07/04/25  0707 07/05/25  0544   K 3.5 3.0* 3.2*     Plan  - Replete potassium per protocol  - Monitor potassium Daily  - Patient's hypokalemia is stable  -

## 2025-07-05 NOTE — DISCHARGE SUMMARY
[facility]/Surg  [facility] Medicine  Discharge Summary      Patient Name: Jennifer Rob  MRN: [id number]  [facility]: [id number]  Patient Class: IP- Inpatient  Admission Date: 6/28/2025  Hospital Length of Stay: 7 days  Discharge Date and Time: No discharge date for patient encounter.  Attending Physician: Riana Leal MD   Discharging Provider: Riana Leal MD  Primary Care Provider: Zak Esteves MD    Primary Care Team: Networked reference to record PCT     HPI:   84-year-old F patient with PMH of Parkinson's disease and bipolar disorder presents to the ER with a fall at home after she tripped on the floor. Family found her on the floor. Patient denies having any loss of consciousness, seizure activity, chest pain shortness of breath or abdominal pain. Family report swelling and erythema on the LLE that started in the calf and then extended circumferentially. Patient on arrival has a temperature of 102.4° F. Labs showed WBC of 20.84, phosphorus of 2.3, Mg 1.7, BNP 2670 and lactate 2.5. She was started on IV antibiotics and admitted for further management.     * No surgery found *      Hospital Course:   Principal Problem:Severe sepsis     Chief Complaint:        Chief Complaint   Patient presents with    Fever    Altered Mental Status    Fall       Family reports pt fell today, unwitnessed, wound to right elbow, pt reports she hit her head, also c/o LLE redness, swelling, warmth. Hx: parkinson's.         HPI: 84-year-old F patient with PMH of Parkinson's disease and bipolar disorder presents to the ER with a fall at home after she tripped on the floor. Family found her on the floor. Patient denies having any loss of consciousness, seizure activity, chest pain shortness of breath or abdominal pain. Family report swelling and erythema on the LLE that started in the calf and then extended circumferentially. Patient on arrival has a temperature of 102.4° F. Labs showed WBC of 20.84,  "phosphorus of 2.3, Mg 1.7, BNP 2670 and lactate 2.5. She was started on IV antibiotics and admitted for further management.      06/29/2025 pt admitted with fecer and sepsis thought to be due to RLE cellulitis, h/ o Parkinson's disease daughter noted limping on right leg x 1-2 days prior to fever.  06/30/2025 more alert today, left LE redness is less  07/01/2025 redness to LLE a bit more and a bit more swollen, WBC stable  Ovrall feeling better  07/02/2025 redness left LE, some bruising not any better last w 2 days, looked a little better initially after abx started, still warm and red. US negative for DVT  07/03/2025 PT STILL WITH LLE redness, bruising, some warmth, US LE negative for DVT.    Pt denies pain, area is angry and red.  07/04/2025 CT no abscess or fluid of LE, likely some arterial insufficiency based on arterial UA and CTA.    Redness is a bit better seems stable   07/05/2025 DISCHARGE SUMMARY: pt admitted with sepsis and cellulitis of the LLE, initially had fever up to 102.  She was started on vanc and zosyn and WBC was 73035.  US Left LE was negative for DVT.  We changed her to Vanc and rocephin due to persistent redness and some concern for more strep infection, also CT of the Left LE was negative for any abscess.  US arterial also done on the Left LE does show evidence of some vascular compromise.  Pt has imrpoved with rocephin and is ready for d/c home will send with Qubriticef.  Recommend she f/u with Dr. Damon to consider further intervention and testing for the vascular disease of Left LE.  She will f/u with PCP.     Goals of Care Treatment Preferences:  Code Status: DNR         Consults:   Consults (From admission, onward)          Status Ordering Provider     Inpatient consult to Social Work/Case Management  Once        Provider:  (Not yet assigned)    Acknowledged MIKE HASSAN     Pharmacy to dose Vancomycin consult  Once        Provider:  (Not yet assigned)   Placed in "And" Linked Group "    Acknowledged BOSTON BRIZUELA     Inpatient consult to Hospitalist  Once        Provider:  Mil Leija DO    Acknowledged MIL LEIJA            Assessment & Plan  Severe sepsis  resolved  Parkinson's disease  - home meds  Bipolar disorder, unspecified  - home meds  Cellulitis  resolved  Hypophosphatemia  stable  Hypomagnesemia  Patient with noted electrolyte deficiencies with Hypo-Magnesemia. Latest electrolytes have been reviewed and values are   Magnesium Level   Date Value Ref Range Status   06/30/2025 2.10 1.80 - 2.40 mg/dL Final   . Will continue to monitor electrolytes closely. Will replace the affected electrolytes and repeat labs to be done after interventions completed.    improved  Anemia  Anemia is likely due to Iron deficiency. Most recent hemoglobin and hematocrit are listed below.  Recent Labs     07/03/25 0454 07/04/25 0706 07/05/25  0543   HGB 10.2* 11.1* 9.6*   HCT 30.7* 33.8* 29.3*     imrpoved  Hyponatremia  Hyponatremia is likely due to Dehydration/hypovolemia. The patient's most recent sodium results are listed below.  Recent Labs     07/03/25  0454 07/04/25  0707 07/05/25  0544    141 141     stable  Thrombocytopenia  The likely etiology of thrombocytopenia is infection. The patients 3 most recent labs are listed below.  Recent Labs     07/03/25 0454 07/04/25  0706 07/05/25  0543    188 222   stable    Primary hypertension  Patient's blood pressure range in the last 24 hours was: BP  Min: 138/56  Max: 165/75.The patient's inpatient anti-hypertensive regimen is listed below:  Current Antihypertensives  metoprolol succinate (TOPROL-XL) 24 hr tablet 25 mg, Daily, Oral  losartan tablet 50 mg, Daily, Oral  amLODIPine tablet 2.5 mg, Nightly, Oral  cloNIDine tablet 0.2 mg, Every 6 hours PRN, Oral  hydrALAZINE injection 10 mg, Every 4 hours PRN, Intravenous    Plan  - BP is controlled, no changes needed to their regimen  -  Resume and continue home meds at  discharge    Hypokalemia  Patient's most recent potassium results are listed below.   Recent Labs     07/03/25  0454 07/04/25  0707 07/05/25  0544   K 3.5 3.0* 3.2*     Plan  - Replete potassium per protocol  - Monitor potassium Daily  - Patient's hypokalemia is stable  -    Final Active Diagnoses:    Diagnosis Date Noted POA    PRINCIPAL PROBLEM:  Severe sepsis [A41.9, R65.20] 06/28/2025 Yes    Cellulitis [L03.90] 06/28/2025 Yes    Anemia [D64.9] 06/29/2025 Yes    Hypomagnesemia [E83.42] 06/28/2025 Yes    Hypokalemia [E87.6] 07/05/2025 Yes    Hyponatremia [E87.1] 06/29/2025 Yes    Thrombocytopenia [D69.6] 06/29/2025 Yes    Primary hypertension [I10] 06/29/2025 Yes    Hypophosphatemia [E83.39] 06/28/2025 Yes    Bipolar disorder, unspecified [F31.9]  Yes    Parkinson's disease [G20.A1] 05/10/2023 Yes      Problems Resolved During this Admission:       Discharged Condition: good    Disposition: Home or Self Care    Follow Up:   Contact information for follow-up providers       Zak Esteves MD Follow up.    Specialty: Internal Medicine  Contact information:  [address] [facility] [address]  [phone]               Michael Damon MD Follow up.    Specialty: Cardiology  Why: vascular evaluation of PAD LLE  Contact information:  [address]  [address]  [facility] [address] [address]  [phone]                       Contact information for after-discharge care       Home Medical Care       [facility] .    Service: Home Health Services  Contact information:  [address] [facility] [address] [phone]                                 Patient Instructions:      Activity as tolerated       Significant Diagnostic Studies: Labs: CMP   Recent Labs   Lab 07/04/25  0707 07/04/25  1457 07/05/25  0544     --  141   K 3.0*  --  3.2*     --  107   CO2 26  --  31     --  86   BUN 13  --  15   CREATININE 1.27* 1.43* 1.43*   CALCIUM 8.6  --  8.6    and CBC   Recent Labs   Lab  07/04/25  0706 07/05/25  0543   WBC 10.42 10.54   HGB 11.1* 9.6*   HCT 33.8* 29.3*    222       Pending Diagnostic Studies:       None           Medications:  Reconciled Home Medications:      Medication List        START taking these medications      cefdinir 300 MG capsule  Commonly known as: OMNICEF  Take 1 capsule (300 mg total) by mouth once daily. for 7 days            CHANGE how you take these medications      * carbidopa-levodopa  mg  mg per tablet  Commonly known as: SINEMET  TAKE 2 TABLETS BY MOUTH ONCE DAILY IN THE MORNING IN  THE  EARLY  MORNING,  AND  2  TABS  AT  NOON  AND  1  TAB  IN  THE  EVENING  What changed: See the new instructions.     * carbidopa-levodopa  mg  mg per tablet  Commonly known as: SINEMET  Take 1 tablet by mouth 3 (three) times daily. 2 tab 730 2 tab 1 pm and 1 tab at 5  What changed: how much to take           * This list has 2 medication(s) that are the same as other medications prescribed for you. Read the directions carefully, and ask your doctor or other care provider to review them with you.                CONTINUE taking these medications      amLODIPine 2.5 MG tablet  Commonly known as: NORVASC  Take 2.5 mg by mouth.     aspirin 81 MG EC tablet  Commonly known as: ECOTRIN  Take 81 mg by mouth every other day.     divalproex 500 MG Tbec  Commonly known as: DEPAKOTE  Take 500 mg by mouth nightly.     ferrous sulfate 325 (65 FE) MG EC tablet  Take 325 mg by mouth. Weekly     LORazepam 1 MG tablet  Commonly known as: ATIVAN  Take 0.5-1 mg by mouth 2 (two) times daily as needed for Anxiety.     metoprolol succinate 25 MG 24 hr tablet  Commonly known as: TOPROL-XL  Take 25 mg by mouth once daily.     olmesartan 20 MG tablet  Commonly known as: BENICAR  Take 20 mg by mouth once daily.     venlafaxine 75 MG 24 hr capsule  Commonly known as: EFFEXOR-XR  Take 75 mg by mouth once daily.     VITAMIN D3 25 mcg (1,000 unit) Chew  Generic drug:  cholecalciferol (vitamin D3)  Take 1 tablet by mouth once daily.              Indwelling Lines/Drains at time of discharge:   Lines/Drains/Airways       Drain  Duration             Female External Urinary Catheter w/ Suction 06/29/25 1905 5 days                    SDOH Screening:  The patient was screened for food insecurity, housing instability, transportation needs, utility difficulties, and interpersonal safety. The social determinant(s) of health identified as a concern this admission are:  Interpersonal Safety    Concerns were discussed with case management and/or community health workers.                 Time spent on the discharge of patient: 37 minutes     Physical Exam  Constitutional:       General: She is not in acute distress.     Appearance: Normal appearance. She is normal weight. She is not ill-appearing.   Cardiovascular:      Rate and Rhythm: Normal rate and regular rhythm.      Pulses: Normal pulses.      Heart sounds: Normal heart sounds.   Pulmonary:      Effort: Pulmonary effort is normal.      Breath sounds: Normal breath sounds.   Abdominal:      General: Abdomen is flat.      Palpations: Abdomen is soft.   Musculoskeletal:      Left lower leg: Edema present.      Comments: Redness and swelling continue with improvement and increased wrinkling due to decreased edema today   Skin:     General: Skin is warm and dry.      Findings: No erythema, lesion or rash.   Neurological:      Mental Status: She is alert. Mental status is at baseline.   Psychiatric:         Behavior: Behavior normal.      Comments: I had a face to face encounter with this patient prior to d/c           Riana Leal MD  Department of Hospital Medicine  [facility]/Surg

## 2025-07-08 ENCOUNTER — PATIENT OUTREACH (OUTPATIENT)
Dept: ADMINISTRATIVE | Facility: CLINIC | Age: 85
End: 2025-07-08
Payer: MEDICARE

## 2025-07-08 NOTE — PROGRESS NOTES
C3 nurse spoke with Jennifer Willingham for a TCC post hospital discharge follow up call. The patient daughter stated pt has a scheduled HOSFU appointment with Zak Esteves MD on 7/9/25 @10am.

## 2025-07-09 DIAGNOSIS — G20.A2 PARKINSON'S DISEASE WITHOUT DYSKINESIA, WITH FLUCTUATING MANIFESTATIONS: ICD-10-CM

## 2025-07-09 RX ORDER — CARBIDOPA AND LEVODOPA 10; 100 MG/1; MG/1
TABLET ORAL
Qty: 150 TABLET | Refills: 0 | Status: SHIPPED | OUTPATIENT
Start: 2025-07-09

## 2025-08-08 DIAGNOSIS — G20.A2 PARKINSON'S DISEASE WITHOUT DYSKINESIA, WITH FLUCTUATING MANIFESTATIONS: Primary | ICD-10-CM

## 2025-08-11 RX ORDER — CARBIDOPA AND LEVODOPA 10; 100 MG/1; MG/1
TABLET ORAL
Qty: 150 TABLET | Refills: 0 | Status: SHIPPED | OUTPATIENT
Start: 2025-08-11